# Patient Record
Sex: FEMALE | Race: WHITE | NOT HISPANIC OR LATINO | Employment: UNEMPLOYED | URBAN - METROPOLITAN AREA
[De-identification: names, ages, dates, MRNs, and addresses within clinical notes are randomized per-mention and may not be internally consistent; named-entity substitution may affect disease eponyms.]

---

## 2017-10-18 ENCOUNTER — APPOINTMENT (OUTPATIENT)
Dept: CARDIOLOGY | Facility: HOSPITAL | Age: 32
End: 2017-10-18
Attending: INTERNAL MEDICINE

## 2017-10-18 ENCOUNTER — APPOINTMENT (OUTPATIENT)
Dept: GENERAL RADIOLOGY | Facility: HOSPITAL | Age: 32
End: 2017-10-18
Attending: INTERNAL MEDICINE

## 2017-10-18 ENCOUNTER — HOSPITAL ENCOUNTER (INPATIENT)
Facility: HOSPITAL | Age: 32
LOS: 2 days | Discharge: HOME | End: 2017-10-20
Attending: INTERNAL MEDICINE | Admitting: INTERNAL MEDICINE

## 2017-10-18 ENCOUNTER — APPOINTMENT (OUTPATIENT)
Dept: MRI IMAGING | Facility: HOSPITAL | Age: 32
End: 2017-10-18
Attending: INTERNAL MEDICINE

## 2017-10-18 PROBLEM — I51.4 MYOCARDITIS (HCC): Status: ACTIVE | Noted: 2017-10-18

## 2017-10-18 LAB
ALBUMIN SERPL-MCNC: 3.9 G/DL (ref 3.5–5.2)
ALBUMIN/GLOB SERPL: 1.5 G/DL
ALP SERPL-CCNC: 62 U/L (ref 39–117)
ALT SERPL W P-5'-P-CCNC: 36 U/L (ref 1–33)
ANION GAP SERPL CALCULATED.3IONS-SCNC: 14.7 MMOL/L
AORTIC DIMENSIONLESS INDEX: 0.7 (DI)
ASCENDING AORTA: 2.7 CM
AST SERPL-CCNC: 24 U/L (ref 1–32)
BH CV ECHO MEAS - ACS: 2.1 CM
BH CV ECHO MEAS - AO MAX PG: 9 MMHG
BH CV ECHO MEAS - AO MEAN PG (FULL): 2 MMHG
BH CV ECHO MEAS - AO MEAN PG: 4 MMHG
BH CV ECHO MEAS - AO ROOT AREA (BSA CORRECTED): 1.4
BH CV ECHO MEAS - AO ROOT AREA: 6.2 CM^2
BH CV ECHO MEAS - AO ROOT DIAM: 2.8 CM
BH CV ECHO MEAS - AO V2 MAX: 146 CM/SEC
BH CV ECHO MEAS - AO V2 MEAN: 96.5 CM/SEC
BH CV ECHO MEAS - AO V2 VTI: 32.9 CM
BH CV ECHO MEAS - ASC AORTA: 2.7 CM
BH CV ECHO MEAS - AVA(I,A): 2.1 CM^2
BH CV ECHO MEAS - AVA(I,D): 2.1 CM^2
BH CV ECHO MEAS - BSA(HAYCOCK): 2.1 M^2
BH CV ECHO MEAS - BSA: 2 M^2
BH CV ECHO MEAS - BZI_BMI: 28.1 KILOGRAMS/M^2
BH CV ECHO MEAS - BZI_METRIC_HEIGHT: 175.3 CM
BH CV ECHO MEAS - BZI_METRIC_WEIGHT: 86.2 KG
BH CV ECHO MEAS - CONTRAST EF (2CH): 56.5 ML/M^2
BH CV ECHO MEAS - CONTRAST EF 4CH: 57.3 ML/M^2
BH CV ECHO MEAS - EDV(CUBED): 132.7 ML
BH CV ECHO MEAS - EDV(MOD-SP2): 138 ML
BH CV ECHO MEAS - EDV(MOD-SP4): 131 ML
BH CV ECHO MEAS - EDV(TEICH): 123.8 ML
BH CV ECHO MEAS - EF(CUBED): 58.6 %
BH CV ECHO MEAS - EF(MOD-SP2): 56.5 %
BH CV ECHO MEAS - EF(MOD-SP4): 57.3 %
BH CV ECHO MEAS - EF(TEICH): 50 %
BH CV ECHO MEAS - ESV(CUBED): 54.9 ML
BH CV ECHO MEAS - ESV(MOD-SP2): 60 ML
BH CV ECHO MEAS - ESV(MOD-SP4): 56 ML
BH CV ECHO MEAS - ESV(TEICH): 62 ML
BH CV ECHO MEAS - FS: 25.5 %
BH CV ECHO MEAS - IVS/LVPW: 1.1
BH CV ECHO MEAS - IVSD: 1 CM
BH CV ECHO MEAS - LAT PEAK E' VEL: 16 CM/SEC
BH CV ECHO MEAS - LV DIASTOLIC VOL/BSA (35-75): 64.8 ML/M^2
BH CV ECHO MEAS - LV MASS(C)D: 175.6 GRAMS
BH CV ECHO MEAS - LV MASS(C)DI: 86.9 GRAMS/M^2
BH CV ECHO MEAS - LV MEAN PG: 2 MMHG
BH CV ECHO MEAS - LV SYSTOLIC VOL/BSA (12-30): 27.7 ML/M^2
BH CV ECHO MEAS - LV V1 MAX: 93 CM/SEC
BH CV ECHO MEAS - LV V1 MEAN: 65.3 CM/SEC
BH CV ECHO MEAS - LV V1 VTI: 22.4 CM
BH CV ECHO MEAS - LVIDD: 5.1 CM
BH CV ECHO MEAS - LVIDS: 3.8 CM
BH CV ECHO MEAS - LVLD AP2: 9 CM
BH CV ECHO MEAS - LVLD AP4: 8.7 CM
BH CV ECHO MEAS - LVLS AP2: 8.1 CM
BH CV ECHO MEAS - LVLS AP4: 7.3 CM
BH CV ECHO MEAS - LVOT AREA (M): 3.1 CM^2
BH CV ECHO MEAS - LVOT AREA: 3.1 CM^2
BH CV ECHO MEAS - LVOT DIAM: 2 CM
BH CV ECHO MEAS - LVPWD: 0.9 CM
BH CV ECHO MEAS - MED PEAK E' VEL: 10 CM/SEC
BH CV ECHO MEAS - MR MAX PG: 26.6 MMHG
BH CV ECHO MEAS - MR MAX VEL: 258 CM/SEC
BH CV ECHO MEAS - MV A DUR: 0.17 SEC
BH CV ECHO MEAS - MV A MAX VEL: 54.4 CM/SEC
BH CV ECHO MEAS - MV DEC SLOPE: 296 CM/SEC^2
BH CV ECHO MEAS - MV DEC TIME: 0.29 SEC
BH CV ECHO MEAS - MV E MAX VEL: 67.5 CM/SEC
BH CV ECHO MEAS - MV E/A: 1.2
BH CV ECHO MEAS - MV MAX PG: 3 MMHG
BH CV ECHO MEAS - MV MEAN PG: 2 MMHG
BH CV ECHO MEAS - MV P1/2T MAX VEL: 88.2 CM/SEC
BH CV ECHO MEAS - MV P1/2T: 87.3 MSEC
BH CV ECHO MEAS - MV V2 MEAN: 58.6 CM/SEC
BH CV ECHO MEAS - MV V2 VTI: 23.7 CM
BH CV ECHO MEAS - MVA P1/2T LCG: 2.5 CM^2
BH CV ECHO MEAS - MVA(P1/2T): 2.5 CM^2
BH CV ECHO MEAS - MVA(VTI): 3 CM^2
BH CV ECHO MEAS - PA ACC SLOPE: 856 CM/SEC^2
BH CV ECHO MEAS - PA ACC TIME: 0.12 SEC
BH CV ECHO MEAS - PA MAX PG (FULL): 2.6 MMHG
BH CV ECHO MEAS - PA MAX PG: 4.2 MMHG
BH CV ECHO MEAS - PA PR(ACCEL): 25 MMHG
BH CV ECHO MEAS - PA V2 MAX: 103 CM/SEC
BH CV ECHO MEAS - PI END-D VEL: 77.2 CM/SEC
BH CV ECHO MEAS - PULM A REVS DUR: 0.12 SEC
BH CV ECHO MEAS - PULM A REVS VEL: 27 CM/SEC
BH CV ECHO MEAS - PULM DIAS VEL: 39.3 CM/SEC
BH CV ECHO MEAS - PULM S/D: 1.5
BH CV ECHO MEAS - PULM SYS VEL: 60.9 CM/SEC
BH CV ECHO MEAS - PVA(V,A): 1.6 CM^2
BH CV ECHO MEAS - PVA(V,D): 1.6 CM^2
BH CV ECHO MEAS - QP/QS: 0.61
BH CV ECHO MEAS - RAP SYSTOLE: 3 MMHG
BH CV ECHO MEAS - RV MAX PG: 1.6 MMHG
BH CV ECHO MEAS - RV MEAN PG: 1 MMHG
BH CV ECHO MEAS - RV V1 MAX: 63.9 CM/SEC
BH CV ECHO MEAS - RV V1 MEAN: 49.1 CM/SEC
BH CV ECHO MEAS - RV V1 VTI: 16.9 CM
BH CV ECHO MEAS - RVOT AREA: 2.5 CM^2
BH CV ECHO MEAS - RVOT DIAM: 1.8 CM
BH CV ECHO MEAS - RVSP: 25.1 MMHG
BH CV ECHO MEAS - SI(AO): 100.2 ML/M^2
BH CV ECHO MEAS - SI(CUBED): 38.5 ML/M^2
BH CV ECHO MEAS - SI(LVOT): 34.8 ML/M^2
BH CV ECHO MEAS - SI(MOD-SP2): 38.6 ML/M^2
BH CV ECHO MEAS - SI(MOD-SP4): 37.1 ML/M^2
BH CV ECHO MEAS - SI(TEICH): 30.6 ML/M^2
BH CV ECHO MEAS - SV(AO): 202.6 ML
BH CV ECHO MEAS - SV(CUBED): 77.8 ML
BH CV ECHO MEAS - SV(LVOT): 70.4 ML
BH CV ECHO MEAS - SV(MOD-SP2): 78 ML
BH CV ECHO MEAS - SV(MOD-SP4): 75 ML
BH CV ECHO MEAS - SV(RVOT): 43 ML
BH CV ECHO MEAS - SV(TEICH): 61.9 ML
BH CV ECHO MEAS - TAPSE (>1.6): 2.3 CM2
BH CV ECHO MEAS - TR MAX VEL: 235 CM/SEC
BH CV VAS BP RIGHT ARM: NORMAL MMHG
BH CV XLRA - RV BASE: 2.8 CM
BH CV XLRA - TDI S': 10 CM/SEC
BILIRUB SERPL-MCNC: 0.3 MG/DL (ref 0.1–1.2)
BUN BLD-MCNC: 11 MG/DL (ref 6–20)
BUN/CREAT SERPL: 16.2 (ref 7–25)
CALCIUM SPEC-SCNC: 8.3 MG/DL (ref 8.6–10.5)
CHLORIDE SERPL-SCNC: 100 MMOL/L (ref 98–107)
CHOLEST SERPL-MCNC: 190 MG/DL (ref 0–200)
CO2 SERPL-SCNC: 23.3 MMOL/L (ref 22–29)
CREAT BLD-MCNC: 0.68 MG/DL (ref 0.57–1)
CRP SERPL-MCNC: 0.35 MG/DL (ref 0–0.5)
DEPRECATED RDW RBC AUTO: 42 FL (ref 37–54)
E/E' RATIO: 9
ERYTHROCYTE [DISTWIDTH] IN BLOOD BY AUTOMATED COUNT: 13.2 % (ref 11.7–13)
ERYTHROCYTE [SEDIMENTATION RATE] IN BLOOD: 3 MM/HR (ref 0–20)
GFR SERPL CREATININE-BSD FRML MDRD: 100 ML/MIN/1.73
GLOBULIN UR ELPH-MCNC: 2.6 GM/DL
GLUCOSE BLD-MCNC: 122 MG/DL (ref 65–99)
HBA1C MFR BLD: 5.2 % (ref 4.8–5.6)
HCT VFR BLD AUTO: 40.2 % (ref 35.6–45.5)
HDLC SERPL-MCNC: 44 MG/DL (ref 40–60)
HGB BLD-MCNC: 13.9 G/DL (ref 11.9–15.5)
LDLC SERPL CALC-MCNC: 125 MG/DL (ref 0–100)
LDLC/HDLC SERPL: 2.85 {RATIO}
LEFT ATRIUM VOLUME INDEX: 27 ML/M2
MCH RBC QN AUTO: 29.9 PG (ref 26.9–32)
MCHC RBC AUTO-ENTMCNC: 34.6 G/DL (ref 32.4–36.3)
MCV RBC AUTO: 86.5 FL (ref 80.5–98.2)
NT-PROBNP SERPL-MCNC: 92.8 PG/ML (ref 0–450)
PLATELET # BLD AUTO: 254 10*3/MM3 (ref 140–500)
PMV BLD AUTO: 10.8 FL (ref 6–12)
POTASSIUM BLD-SCNC: 3.3 MMOL/L (ref 3.5–5.2)
PROT SERPL-MCNC: 6.5 G/DL (ref 6–8.5)
RBC # BLD AUTO: 4.65 10*6/MM3 (ref 3.9–5.2)
SODIUM BLD-SCNC: 138 MMOL/L (ref 136–145)
TRIGL SERPL-MCNC: 103 MG/DL (ref 0–150)
TROPONIN T SERPL-MCNC: 0.03 NG/ML (ref 0–0.03)
TSH SERPL DL<=0.05 MIU/L-ACNC: 1.95 MIU/ML (ref 0.27–4.2)
VLDLC SERPL-MCNC: 20.6 MG/DL (ref 5–40)
WBC NRBC COR # BLD: 10.62 10*3/MM3 (ref 4.5–10.7)

## 2017-10-18 PROCEDURE — B215YZZ FLUOROSCOPY OF LEFT HEART USING OTHER CONTRAST: ICD-10-PCS | Performed by: INTERNAL MEDICINE

## 2017-10-18 PROCEDURE — 93010 ELECTROCARDIOGRAM REPORT: CPT | Performed by: INTERNAL MEDICINE

## 2017-10-18 PROCEDURE — 25010000002 MIDAZOLAM PER 1 MG: Performed by: INTERNAL MEDICINE

## 2017-10-18 PROCEDURE — 83880 ASSAY OF NATRIURETIC PEPTIDE: CPT | Performed by: INTERNAL MEDICINE

## 2017-10-18 PROCEDURE — A9577 INJ MULTIHANCE: HCPCS | Performed by: INTERNAL MEDICINE

## 2017-10-18 PROCEDURE — 0 IOPAMIDOL PER 1 ML: Performed by: INTERNAL MEDICINE

## 2017-10-18 PROCEDURE — 80061 LIPID PANEL: CPT | Performed by: INTERNAL MEDICINE

## 2017-10-18 PROCEDURE — C1769 GUIDE WIRE: HCPCS | Performed by: INTERNAL MEDICINE

## 2017-10-18 PROCEDURE — 84443 ASSAY THYROID STIM HORMONE: CPT | Performed by: INTERNAL MEDICINE

## 2017-10-18 PROCEDURE — 4A023N7 MEASUREMENT OF CARDIAC SAMPLING AND PRESSURE, LEFT HEART, PERCUTANEOUS APPROACH: ICD-10-PCS | Performed by: INTERNAL MEDICINE

## 2017-10-18 PROCEDURE — 85027 COMPLETE CBC AUTOMATED: CPT | Performed by: INTERNAL MEDICINE

## 2017-10-18 PROCEDURE — 0 GADOBENATE DIMEGLUMINE 529 MG/ML SOLUTION: Performed by: INTERNAL MEDICINE

## 2017-10-18 PROCEDURE — 93458 L HRT ARTERY/VENTRICLE ANGIO: CPT | Performed by: INTERNAL MEDICINE

## 2017-10-18 PROCEDURE — 75561 CARDIAC MRI FOR MORPH W/DYE: CPT | Performed by: INTERNAL MEDICINE

## 2017-10-18 PROCEDURE — 25010000002 ONDANSETRON PER 1 MG: Performed by: INTERNAL MEDICINE

## 2017-10-18 PROCEDURE — 80053 COMPREHEN METABOLIC PANEL: CPT | Performed by: INTERNAL MEDICINE

## 2017-10-18 PROCEDURE — 99223 1ST HOSP IP/OBS HIGH 75: CPT | Performed by: INTERNAL MEDICINE

## 2017-10-18 PROCEDURE — C1894 INTRO/SHEATH, NON-LASER: HCPCS | Performed by: INTERNAL MEDICINE

## 2017-10-18 PROCEDURE — 99152 MOD SED SAME PHYS/QHP 5/>YRS: CPT | Performed by: INTERNAL MEDICINE

## 2017-10-18 PROCEDURE — 93306 TTE W/DOPPLER COMPLETE: CPT

## 2017-10-18 PROCEDURE — 71020 HC CHEST PA AND LATERAL: CPT

## 2017-10-18 PROCEDURE — 93005 ELECTROCARDIOGRAM TRACING: CPT | Performed by: INTERNAL MEDICINE

## 2017-10-18 PROCEDURE — 85652 RBC SED RATE AUTOMATED: CPT | Performed by: INTERNAL MEDICINE

## 2017-10-18 PROCEDURE — 25010000002 FENTANYL CITRATE (PF) 100 MCG/2ML SOLUTION: Performed by: INTERNAL MEDICINE

## 2017-10-18 PROCEDURE — 84484 ASSAY OF TROPONIN QUANT: CPT | Performed by: INTERNAL MEDICINE

## 2017-10-18 PROCEDURE — 86140 C-REACTIVE PROTEIN: CPT | Performed by: INTERNAL MEDICINE

## 2017-10-18 PROCEDURE — 75561 CARDIAC MRI FOR MORPH W/DYE: CPT

## 2017-10-18 PROCEDURE — B211YZZ FLUOROSCOPY OF MULTIPLE CORONARY ARTERIES USING OTHER CONTRAST: ICD-10-PCS | Performed by: INTERNAL MEDICINE

## 2017-10-18 PROCEDURE — 25010000002 HEPARIN (PORCINE) PER 1000 UNITS: Performed by: INTERNAL MEDICINE

## 2017-10-18 PROCEDURE — 93306 TTE W/DOPPLER COMPLETE: CPT | Performed by: INTERNAL MEDICINE

## 2017-10-18 PROCEDURE — 83036 HEMOGLOBIN GLYCOSYLATED A1C: CPT | Performed by: INTERNAL MEDICINE

## 2017-10-18 RX ORDER — ONDANSETRON 2 MG/ML
4 INJECTION INTRAMUSCULAR; INTRAVENOUS EVERY 6 HOURS PRN
Status: DISCONTINUED | OUTPATIENT
Start: 2017-10-18 | End: 2017-10-20 | Stop reason: HOSPADM

## 2017-10-18 RX ORDER — TEMAZEPAM 15 MG/1
15 CAPSULE ORAL NIGHTLY PRN
Status: DISCONTINUED | OUTPATIENT
Start: 2017-10-18 | End: 2017-10-20 | Stop reason: HOSPADM

## 2017-10-18 RX ORDER — FENTANYL CITRATE 50 UG/ML
INJECTION, SOLUTION INTRAMUSCULAR; INTRAVENOUS AS NEEDED
Status: DISCONTINUED | OUTPATIENT
Start: 2017-10-18 | End: 2017-10-18 | Stop reason: HOSPADM

## 2017-10-18 RX ORDER — POTASSIUM CHLORIDE 750 MG/1
20 CAPSULE, EXTENDED RELEASE ORAL DAILY
Status: DISCONTINUED | OUTPATIENT
Start: 2017-10-18 | End: 2017-10-20 | Stop reason: HOSPADM

## 2017-10-18 RX ORDER — COLCHICINE 0.6 MG/1
0.6 TABLET ORAL DAILY
Status: DISCONTINUED | OUTPATIENT
Start: 2017-10-18 | End: 2017-10-19

## 2017-10-18 RX ORDER — SODIUM CHLORIDE 9 MG/ML
INJECTION, SOLUTION INTRAVENOUS CONTINUOUS PRN
Status: DISCONTINUED | OUTPATIENT
Start: 2017-10-18 | End: 2017-10-18 | Stop reason: HOSPADM

## 2017-10-18 RX ORDER — LIDOCAINE HYDROCHLORIDE 20 MG/ML
INJECTION, SOLUTION INFILTRATION; PERINEURAL AS NEEDED
Status: DISCONTINUED | OUTPATIENT
Start: 2017-10-18 | End: 2017-10-18 | Stop reason: HOSPADM

## 2017-10-18 RX ORDER — NITROGLYCERIN 20 MG/100ML
INJECTION INTRAVENOUS CONTINUOUS PRN
Status: DISCONTINUED | OUTPATIENT
Start: 2017-10-18 | End: 2017-10-18 | Stop reason: HOSPADM

## 2017-10-18 RX ORDER — SODIUM CHLORIDE 9 MG/ML
100 INJECTION, SOLUTION INTRAVENOUS CONTINUOUS
Status: DISCONTINUED | OUTPATIENT
Start: 2017-10-18 | End: 2017-10-20 | Stop reason: HOSPADM

## 2017-10-18 RX ORDER — ONDANSETRON 4 MG/1
4 TABLET, FILM COATED ORAL EVERY 6 HOURS PRN
Status: DISCONTINUED | OUTPATIENT
Start: 2017-10-18 | End: 2017-10-20 | Stop reason: HOSPADM

## 2017-10-18 RX ORDER — HYDROCODONE BITARTRATE AND ACETAMINOPHEN 5; 325 MG/1; MG/1
1 TABLET ORAL EVERY 4 HOURS PRN
Status: DISCONTINUED | OUTPATIENT
Start: 2017-10-18 | End: 2017-10-20 | Stop reason: HOSPADM

## 2017-10-18 RX ORDER — MIDAZOLAM HYDROCHLORIDE 1 MG/ML
INJECTION INTRAMUSCULAR; INTRAVENOUS AS NEEDED
Status: DISCONTINUED | OUTPATIENT
Start: 2017-10-18 | End: 2017-10-18 | Stop reason: HOSPADM

## 2017-10-18 RX ORDER — ONDANSETRON 2 MG/ML
INJECTION INTRAMUSCULAR; INTRAVENOUS AS NEEDED
Status: DISCONTINUED | OUTPATIENT
Start: 2017-10-18 | End: 2017-10-18 | Stop reason: HOSPADM

## 2017-10-18 RX ORDER — CALCIUM CARBONATE 200(500)MG
2 TABLET,CHEWABLE ORAL 2 TIMES DAILY PRN
Status: DISCONTINUED | OUTPATIENT
Start: 2017-10-18 | End: 2017-10-20 | Stop reason: HOSPADM

## 2017-10-18 RX ORDER — METOPROLOL TARTRATE 5 MG/5ML
INJECTION INTRAVENOUS AS NEEDED
Status: DISCONTINUED | OUTPATIENT
Start: 2017-10-18 | End: 2017-10-18 | Stop reason: HOSPADM

## 2017-10-18 RX ORDER — LOSARTAN POTASSIUM 25 MG/1
25 TABLET ORAL DAILY
Status: DISCONTINUED | OUTPATIENT
Start: 2017-10-18 | End: 2017-10-20 | Stop reason: HOSPADM

## 2017-10-18 RX ORDER — ONDANSETRON 4 MG/1
4 TABLET, ORALLY DISINTEGRATING ORAL EVERY 6 HOURS PRN
Status: DISCONTINUED | OUTPATIENT
Start: 2017-10-18 | End: 2017-10-20 | Stop reason: HOSPADM

## 2017-10-18 RX ORDER — ACETAMINOPHEN 325 MG/1
650 TABLET ORAL EVERY 6 HOURS PRN
Status: DISCONTINUED | OUTPATIENT
Start: 2017-10-18 | End: 2017-10-20 | Stop reason: HOSPADM

## 2017-10-18 RX ADMIN — LOSARTAN POTASSIUM 25 MG: 25 TABLET, FILM COATED ORAL at 18:16

## 2017-10-18 RX ADMIN — ONDANSETRON 4 MG: 2 INJECTION INTRAMUSCULAR; INTRAVENOUS at 12:56

## 2017-10-18 RX ADMIN — COLCHICINE 0.6 MG: 0.6 TABLET, FILM COATED ORAL at 20:17

## 2017-10-18 RX ADMIN — METOPROLOL TARTRATE 25 MG: 25 TABLET ORAL at 23:26

## 2017-10-18 RX ADMIN — ACETAMINOPHEN 650 MG: 325 TABLET ORAL at 20:16

## 2017-10-18 RX ADMIN — POTASSIUM CHLORIDE 20 MEQ: 750 CAPSULE, EXTENDED RELEASE ORAL at 18:16

## 2017-10-18 RX ADMIN — ACETAMINOPHEN 650 MG: 325 TABLET ORAL at 13:30

## 2017-10-18 RX ADMIN — METOPROLOL TARTRATE 25 MG: 25 TABLET ORAL at 14:18

## 2017-10-18 RX ADMIN — GADOBENATE DIMEGLUMINE 18 ML: 529 INJECTION, SOLUTION INTRAVENOUS at 11:15

## 2017-10-18 RX ADMIN — SODIUM CHLORIDE 100 ML/HR: 9 INJECTION, SOLUTION INTRAVENOUS at 14:19

## 2017-10-18 NOTE — PLAN OF CARE
Problem: Patient Care Overview (Adult)  Goal: Plan of Care Review  Outcome: Ongoing (interventions implemented as appropriate)    10/18/17 7453   Coping/Psychosocial Response Interventions   Plan Of Care Reviewed With patient;spouse   Patient Care Overview   Progress improving   Outcome Evaluation   Outcome Summary/Follow up Plan no intervention done in cath lab per Dr. Freeman. R radial site is c/d/i and soft to touch. VSS. will continue to monitor.       Goal: Adult Individualization and Mutuality  Outcome: Ongoing (interventions implemented as appropriate)  Goal: Discharge Needs Assessment  Outcome: Ongoing (interventions implemented as appropriate)    Problem: Cardiac Catheterization with/without PCI (Adult)  Goal: Signs and Symptoms of Listed Potential Problems Will be Absent or Manageable (Cardiac Catheterization with/without PCI)  Outcome: Ongoing (interventions implemented as appropriate)    Problem: Fall Risk (Adult)  Goal: Identify Related Risk Factors and Signs and Symptoms  Outcome: Ongoing (interventions implemented as appropriate)  Goal: Absence of Falls  Outcome: Ongoing (interventions implemented as appropriate)

## 2017-10-18 NOTE — H&P
Date of Hospital Visit: 10/18/17  Encounter Provider: Rishi Freeman MD  Place of Service: [facility] CARDIOLOGY  Patient Name: Kady Valiente  :1985  [id number]      Chief complaint: Chest pain      History of Present Illness: 32-year-old woman previously healthy recently had a child 2 months ago.  No recent clinical illness.  Who was awaken this morning with severe substernal chest pain and shortness of breath.  911 was called and [facility] recognized a STEMI.  Air transport was arranged and she was picked up in the field and brought directly here.  On arrival here she is having ongoing chest discomfort and feels weak area she did have some hypertension during her pregnancy but otherwise has not had any past medical history specifically denies tobacco abuse drug abuse hypertension diabetes or hyperlipidemia.  She has not had any recent illness.  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      No past surgical history on file.    No prescriptions prior to admission.       Current Meds  No current facility-administered medications on file prior to encounter.      No current outpatient prescriptions on file prior to encounter.         Social History     Social History   • Marital status:      Spouse name: N/A   • Number of children: N/A   • Years of education: N/A     Occupational History   • Not on file.     Social History Main Topics   • Smoking status: Not on file   • Smokeless tobacco: Not on file   • Alcohol use Not on file   • Drug use: Not on file   • Sexual activity: Not on file     Other Topics Concern   • Not on file     Social History Narrative       Family Hx: Non-contributory      REVIEW OF SYSTEMS:   LEOLA  was performed and is negative except as outlined in HPI     REVIEW OF SYSTEMS:   CONSTITUTIONAL: No weight loss, fever, chills, weakness or fatigue.   HEENT: Eyes: No visual loss, blurred vision, double vision or yellow sclerae. Ears, Nose, Throat: No hearing loss, sneezing, congestion, runny nose or sore throat.   SKIN: No rash or itching.     RESPIRATORY: No shortness of breath, hemoptysis, cough or sputum.   GASTROINTESTINAL: No anorexia, nausea, vomiting or diarrhea. No abdominal pain, bright red blood per rectum or melena.  NEUROLOGICAL: No headache, dizziness, syncope, paralysis, numbness or tingling in the extremities.  MUSCULOSKELETAL: No muscle, back pain, joint pain or stiffness.   HEMATOLOGIC: No anemia, bleeding or bruising.   LYMPHATICS: No enlarged nodes.  PSYCHIATRIC: No history of depression, anxiety, hallucinations.   ENDOCRINOLOGIC: No reports of sweating, cold or heat intolerance. No polyuria or polydipsia.       Objective:     Vitals:    10/18/17 0901 10/18/17 0906   Resp: 16 17     There is no height or weight on file to calculate BMI.      General Appearance:    Alert, oriented x 3, in no acute distress   Head:    Normocephalic, without obvious abnormality, atraumatic   Ears:    Ears appear intact with no abnormalities noted   Throat:   No oral lesions, dentition good   Neck:   No adenopathy, supple, trachea midline, no thyromegaly, no carotid bruit, no JVD   Lungs:    Breath sounds are equal and  clear to auscultation    Heart:   Normal S1 and S2, RRR, no murmur/gallop or rub   Abdomen:    Normal bowel sounds, obese, soft non-tender, non-distended, no organomegaly, no guarding   Extremities:   Moves all extremities well, no edema, no cyanosis, no redness   Pulses:   Pulses palpable and equal bilaterally. Normal radial pulses   Skin:   No bleeding, bruising or rash   Lymph nodes:   No palpable adenopathy     I personally viewed and interpreted the patient's EKG/Telemetry  data    Assessment:  Active Hospital Problems (** Indicates Principal Problem)    Diagnosis Date Noted   • Myocarditis [I51.4] 10/18/2017      Resolved Hospital Problems    Diagnosis Date Noted Date Resolved   No resolved problems to display.         Plan:Surprisingly on her ECG it does appear that she is having an inferolateral MI area and this is obviously a little bit of an odd situation and a young woman with no clear cardiac risk factors I suspect if she is having a cardiac issue that it could be either spontaneous coronary artery dissection, vasospasm, traditional atherosclerosis or possibly a perimyocarditis area we are going to take her directly to the Cath Lab where we will do a complete diagnostic catheterization.  The right radial artery further decisions will be based on the findings at the time of catheter.  However if we do not find anything significant with catheter will have to consider an MRI    It appears that she has a focal area of myocarditis in her inferior wall on MRI.  We will start some colchicine, a little bit of beta blockade, and an ARB to keep her pressure down, but I anticipate she will make a complete recovery.  We will watch her for any arrhythmia for at least another 24 hours.

## 2017-10-18 NOTE — CONSULTS
Reviewed chart secondary to referral for cardiac rehab evaluate/enroll.  Read Dr. Freeman's note from cath lab procedure.  Pt needing further workup to clarify diagnosis.  Pt will need to rule-in for MI to qualify for phase II cardiac rehab.  She also lives closer to [facility]'s cardiac rehab program.

## 2017-10-18 NOTE — DISCHARGE INSTRUCTIONS
[facility]  [address] [place] [address]    Coronary Angiogram (Radial/Ulnar Approach) After Care    Refer to this sheet in the next few weeks. These instructions provide you with information on caring for yourself after your procedure. Your caregiver may also give you more specific instructions. Your treatment has been planned according to current medical practices, but problems sometimes occur. Call your caregiver if you have any problems or questions after your procedure.    Home Care Instructions:  · You may shower the day after the procedure. Remove the bandage (dressing) and gently wash the site with plain soap and water. Gently pat the site dry. You may apply a band aid daily for 2 days if desired.    · Do not apply powder or lotion to the site.  · Do not submerge the affected site in water for 3 to 5 days or until the site is completely healed.   · Do not lift, push or pull anything over 10 pounds for 2 days after your procedure.  · Inspect the site at least twice daily. You may notice some bruising at the site and it may be tender for 1 to 2 weeks.     · Increase your fluid intake for the next 2 days.    · Keep arm elevated for 24 hours. For the remainder of the day, keep your arm in “Pledge of Allegiance” position when up and about.     · You may drive 24 hours after the procedure unless otherwise instructed by your caregiver.  · Do not operate machinery or power tools for 24 hours.  · A responsible adult should be with you for the first 24 hours after you arrive home. Do not make any important legal decisions or sign legal papers for 24 hours.      Call Your Doctor if:   · You have unusual pain at the radial/ulnar (wrist) site.  · You have redness, warmth, swelling, or pain at the radial/ulnar (wrist) site.  · You have drainage (other than a small amount of blood on the dressing).  · You have chills or a fever > 101.  · Your arm becomes pale or dark, cool, tingly, or numb.  · You  have heavy bleeding from the site, hold pressure on the site for 20 minutes.  If the bleeding stops, apply a fresh bandage and call your cardiologist.  However, if you continue to have bleeding, call 911.

## 2017-10-19 LAB
AMPHET+METHAMPHET UR QL: NEGATIVE
ANION GAP SERPL CALCULATED.3IONS-SCNC: 11.8 MMOL/L
B PERT DNA SPEC QL NAA+PROBE: NOT DETECTED
BARBITURATES UR QL SCN: NEGATIVE
BENZODIAZ UR QL SCN: POSITIVE
BUN BLD-MCNC: 7 MG/DL (ref 6–20)
BUN/CREAT SERPL: 11.3 (ref 7–25)
C PNEUM DNA NPH QL NAA+NON-PROBE: NOT DETECTED
CALCIUM SPEC-SCNC: 8.6 MG/DL (ref 8.6–10.5)
CANNABINOIDS SERPL QL: NEGATIVE
CHLORIDE SERPL-SCNC: 104 MMOL/L (ref 98–107)
CO2 SERPL-SCNC: 23.2 MMOL/L (ref 22–29)
COCAINE UR QL: NEGATIVE
CREAT BLD-MCNC: 0.62 MG/DL (ref 0.57–1)
CRP SERPL-MCNC: 0.43 MG/DL (ref 0–0.5)
DEPRECATED RDW RBC AUTO: 44.7 FL (ref 37–54)
ERYTHROCYTE [DISTWIDTH] IN BLOOD BY AUTOMATED COUNT: 13.5 % (ref 11.7–13)
FLUAV H1 2009 PAND RNA NPH QL NAA+PROBE: NOT DETECTED
FLUAV H1 HA GENE NPH QL NAA+PROBE: NOT DETECTED
FLUAV H3 RNA NPH QL NAA+PROBE: NOT DETECTED
FLUAV SUBTYP SPEC NAA+PROBE: NOT DETECTED
FLUBV RNA ISLT QL NAA+PROBE: NOT DETECTED
GFR SERPL CREATININE-BSD FRML MDRD: 112 ML/MIN/1.73
GLUCOSE BLD-MCNC: 94 MG/DL (ref 65–99)
HADV DNA SPEC NAA+PROBE: NOT DETECTED
HCOV 229E RNA SPEC QL NAA+PROBE: NOT DETECTED
HCOV HKU1 RNA SPEC QL NAA+PROBE: NOT DETECTED
HCOV NL63 RNA SPEC QL NAA+PROBE: NOT DETECTED
HCOV OC43 RNA SPEC QL NAA+PROBE: NOT DETECTED
HCT VFR BLD AUTO: 43.2 % (ref 35.6–45.5)
HGB BLD-MCNC: 13.9 G/DL (ref 11.9–15.5)
HMPV RNA NPH QL NAA+NON-PROBE: NOT DETECTED
HPIV1 RNA SPEC QL NAA+PROBE: NOT DETECTED
HPIV2 RNA SPEC QL NAA+PROBE: NOT DETECTED
HPIV3 RNA NPH QL NAA+PROBE: NOT DETECTED
HPIV4 P GENE NPH QL NAA+PROBE: NOT DETECTED
M PNEUMO IGG SER IA-ACNC: NOT DETECTED
MCH RBC QN AUTO: 29.5 PG (ref 26.9–32)
MCHC RBC AUTO-ENTMCNC: 32.2 G/DL (ref 32.4–36.3)
MCV RBC AUTO: 91.7 FL (ref 80.5–98.2)
METHADONE UR QL SCN: NEGATIVE
OPIATES UR QL: NEGATIVE
OXYCODONE UR QL SCN: NEGATIVE
PLATELET # BLD AUTO: 237 10*3/MM3 (ref 140–500)
PMV BLD AUTO: 10.6 FL (ref 6–12)
POTASSIUM BLD-SCNC: 4.1 MMOL/L (ref 3.5–5.2)
RBC # BLD AUTO: 4.71 10*6/MM3 (ref 3.9–5.2)
RHINOVIRUS RNA SPEC NAA+PROBE: NOT DETECTED
RSV RNA NPH QL NAA+NON-PROBE: NOT DETECTED
SODIUM BLD-SCNC: 139 MMOL/L (ref 136–145)
TROPONIN T SERPL-MCNC: 1.13 NG/ML (ref 0–0.03)
WBC NRBC COR # BLD: 11.35 10*3/MM3 (ref 4.5–10.7)

## 2017-10-19 PROCEDURE — 84484 ASSAY OF TROPONIN QUANT: CPT | Performed by: INTERNAL MEDICINE

## 2017-10-19 PROCEDURE — 85027 COMPLETE CBC AUTOMATED: CPT | Performed by: INTERNAL MEDICINE

## 2017-10-19 PROCEDURE — 99233 SBSQ HOSP IP/OBS HIGH 50: CPT | Performed by: INTERNAL MEDICINE

## 2017-10-19 PROCEDURE — 87633 RESP VIRUS 12-25 TARGETS: CPT | Performed by: INTERNAL MEDICINE

## 2017-10-19 PROCEDURE — 80307 DRUG TEST PRSMV CHEM ANLYZR: CPT | Performed by: INTERNAL MEDICINE

## 2017-10-19 PROCEDURE — 86140 C-REACTIVE PROTEIN: CPT | Performed by: INTERNAL MEDICINE

## 2017-10-19 PROCEDURE — 87581 M.PNEUMON DNA AMP PROBE: CPT | Performed by: INTERNAL MEDICINE

## 2017-10-19 PROCEDURE — 93010 ELECTROCARDIOGRAM REPORT: CPT | Performed by: INTERNAL MEDICINE

## 2017-10-19 PROCEDURE — 80048 BASIC METABOLIC PNL TOTAL CA: CPT | Performed by: INTERNAL MEDICINE

## 2017-10-19 PROCEDURE — 87486 CHLMYD PNEUM DNA AMP PROBE: CPT | Performed by: INTERNAL MEDICINE

## 2017-10-19 PROCEDURE — 87798 DETECT AGENT NOS DNA AMP: CPT | Performed by: INTERNAL MEDICINE

## 2017-10-19 PROCEDURE — 93005 ELECTROCARDIOGRAM TRACING: CPT | Performed by: INTERNAL MEDICINE

## 2017-10-19 RX ORDER — METOPROLOL TARTRATE 50 MG/1
50 TABLET, FILM COATED ORAL EVERY 12 HOURS SCHEDULED
Status: DISCONTINUED | OUTPATIENT
Start: 2017-10-19 | End: 2017-10-20 | Stop reason: HOSPADM

## 2017-10-19 RX ORDER — COLCHICINE 0.6 MG/1
0.6 TABLET ORAL EVERY 12 HOURS SCHEDULED
Status: DISCONTINUED | OUTPATIENT
Start: 2017-10-19 | End: 2017-10-20

## 2017-10-19 RX ADMIN — COLCHICINE 0.6 MG: 0.6 TABLET, FILM COATED ORAL at 20:06

## 2017-10-19 RX ADMIN — METOPROLOL TARTRATE 50 MG: 25 TABLET ORAL at 09:50

## 2017-10-19 RX ADMIN — POTASSIUM CHLORIDE 20 MEQ: 750 CAPSULE, EXTENDED RELEASE ORAL at 09:51

## 2017-10-19 RX ADMIN — LOSARTAN POTASSIUM 25 MG: 25 TABLET, FILM COATED ORAL at 09:51

## 2017-10-19 RX ADMIN — ACETAMINOPHEN 650 MG: 325 TABLET ORAL at 15:39

## 2017-10-19 RX ADMIN — COLCHICINE 0.6 MG: 0.6 TABLET, FILM COATED ORAL at 09:51

## 2017-10-19 RX ADMIN — METOPROLOL TARTRATE 50 MG: 25 TABLET ORAL at 20:06

## 2017-10-19 NOTE — PLAN OF CARE
Problem: Patient Care Overview (Adult)  Goal: Plan of Care Review  Outcome: Ongoing (interventions implemented as appropriate)    10/19/17 1800   Patient Care Overview   Progress improving   Outcome Evaluation   Outcome Summary/Follow up Plan HR in SR. VSS. plan for dc tomorrow per Dr. Freeman       Goal: Adult Individualization and Mutuality  Outcome: Ongoing (interventions implemented as appropriate)  Goal: Discharge Needs Assessment  Outcome: Ongoing (interventions implemented as appropriate)    Problem: Cardiac Catheterization with/without PCI (Adult)  Goal: Signs and Symptoms of Listed Potential Problems Will be Absent or Manageable (Cardiac Catheterization with/without PCI)  Outcome: Ongoing (interventions implemented as appropriate)    Problem: Fall Risk (Adult)  Goal: Identify Related Risk Factors and Signs and Symptoms  Outcome: Ongoing (interventions implemented as appropriate)  Goal: Absence of Falls  Outcome: Ongoing (interventions implemented as appropriate)

## 2017-10-19 NOTE — CONSULTS
Reviewed Dr. Freeman's note from this am.  Myocarditis appears to be diagnosis, so she would not qualify for cardiac rehab.

## 2017-10-19 NOTE — PAYOR COMM NOTE
"DenisKady padilla (32 y.o. Female)            ATTENTION; NURSE REVIEW, REPLY TO UR DEPT, WILFREDO [facility] N  OR UR FAX          [phone] / AUTH PENDING [id number]       Date of Birth Social Security Number Address Home Phone MRN    1985  [address] [place] [address] [phone] [id number]    Oriental orthodox Marital Status          Unknown        Admission Date Admission Type Admitting Provider Attending Provider Department, Room/Bed    10/18/17 Elective Rishi Freeman MD Dillon, William, MD [address] [facility],     Discharge Date Discharge Disposition Discharge Destination                      Attending Provider: Rishi Freeman MD     Allergies:  Penicillins, Prednisone    Isolation:  None   Infection:  None   Code Status:  FULL    Ht:  69\" (175.3 cm)   Wt:  190 lb (86.2 kg)    Admission Cmt:  None   Principal Problem:  None                Active Insurance as of 10/18/2017     Primary Coverage     Payor Plan Insurance Group Employer/Plan Group    [employer] [facility] [employer] [facility] PPO [id number]     Payor Plan Address Payor Plan Phone Number Effective From Effective To    [address] [phone] 10/1/2016     [place] [address]       Subscriber Name Subscriber Birth Date Member ID       SRIKANTH SMITH 7/3/1981 [id number]                 Emergency Contacts      (Rel.) Home Phone Work Phone Mobile Phone    Srikanth Smith (Spouse) [phone] -- --               History & Physical      Rishi Freeman MD at 10/18/2017  9:25 AM          Date of Hospital Visit: 10/18/17  Encounter Provider: Rishi Freeman MD  Place of Service: [facility] CARDIOLOG[facility]  Patient Name: Kady Smith  :1985  [id number]      Chief complaint: Chest pain      History of Present Illness: 32-year-old woman previously healthy recently had a child 2 months ago.  No recent clinical illness.  Who was awaken this " morning with severe substernal chest pain and shortness of breath.  911 was called and [facility] recognized a STEMI.  Air transport was arranged and she was picked up in the field and brought directly here.  On arrival here she is having ongoing chest discomfort and feels weak area she did have some hypertension during her pregnancy but otherwise has not had any past medical history specifically denies tobacco abuse drug abuse hypertension diabetes or hyperlipidemia.  She has not had any recent illness.  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      No past surgical history on file.    No prescriptions prior to admission.       Current Meds  No current facility-administered medications on file prior to encounter.      No current outpatient prescriptions on file prior to encounter.         Social History     Social History   • Marital status:      Spouse name: N/A   • Number of children: N/A   • Years of education: N/A     Occupational History   • Not on file.     Social History Main Topics   • Smoking status: Not on file   • Smokeless tobacco: Not on file   • Alcohol use Not on file   • Drug use: Not on file   • Sexual activity: Not on file     Other Topics Concern   • Not on file     Social History Narrative       Family Hx: Non-contributory      REVIEW OF SYSTEMS:   ROS was performed and is negative except as outlined in HPI     REVIEW OF SYSTEMS:   CONSTITUTIONAL: No weight loss, fever, chills, weakness or fatigue.   HEENT: Eyes: No visual loss, blurred vision, double vision or yellow sclerae. Ears, Nose, Throat: No hearing loss, sneezing, congestion, runny nose or sore throat.   SKIN: No rash or itching.     RESPIRATORY: No shortness of breath,  hemoptysis, cough or sputum.   GASTROINTESTINAL: No anorexia, nausea, vomiting or diarrhea. No abdominal pain, bright red blood per rectum or melena.  NEUROLOGICAL: No headache, dizziness, syncope, paralysis, numbness or tingling in the extremities.  MUSCULOSKELETAL: No muscle, back pain, joint pain or stiffness.   HEMATOLOGIC: No anemia, bleeding or bruising.   LYMPHATICS: No enlarged nodes.  PSYCHIATRIC: No history of depression, anxiety, hallucinations.   ENDOCRINOLOGIC: No reports of sweating, cold or heat intolerance. No polyuria or polydipsia.       Objective:     Vitals:    10/18/17 0901 10/18/17 0906   Resp: 16 17     There is no height or weight on file to calculate BMI.      General Appearance:    Alert, oriented x 3, in no acute distress   Head:    Normocephalic, without obvious abnormality, atraumatic   Ears:    Ears appear intact with no abnormalities noted   Throat:   No oral lesions, dentition good   Neck:   No adenopathy, supple, trachea midline, no thyromegaly, no carotid bruit, no JVD   Lungs:    Breath sounds are equal and  clear to auscultation    Heart:   Normal S1 and S2, RRR, no murmur/gallop or rub   Abdomen:    Normal bowel sounds, obese, soft non-tender, non-distended, no organomegaly, no guarding   Extremities:   Moves all extremities well, no edema, no cyanosis, no redness   Pulses:   Pulses palpable and equal bilaterally. Normal radial pulses   Skin:   No bleeding, bruising or rash   Lymph nodes:   No palpable adenopathy     I personally viewed and interpreted the patient's EKG/Telemetry data    Assessment:  Active Hospital Problems (** Indicates Principal Problem)    Diagnosis Date Noted   • Myocarditis [I51.4] 10/18/2017      Resolved Hospital Problems    Diagnosis Date Noted Date Resolved   No resolved problems to display.         Plan:Surprisingly on her ECG it does appear that she is having an inferolateral MI area and this is obviously a little bit of an odd situation and a young  woman with no clear cardiac risk factors I suspect if she is having a cardiac issue that it could be either spontaneous coronary artery dissection, vasospasm, traditional atherosclerosis or possibly a perimyocarditis area we are going to take her directly to the Cath Lab where we will do a complete diagnostic catheterization.  The right radial artery further decisions will be based on the findings at the time of catheter.  However if we do not find anything significant with catheter will have to consider an MRI    It appears that she has a focal area of myocarditis in her inferior wall on MRI.  We will start some colchicine, a little bit of beta blockade, and an ARB to keep her pressure down, but I anticipate she will make a complete recovery.  We will watch her for any arrhythmia for at least another 24 hours.            Electronically signed by Rishi Freeman MD at 10/19/2017  7:28 AM          [facility] CATH LAB  [address] [place] [id number]  [phone]             Patient Information   Patient Name MRN Sex  (Age)   Kady Valiente [id number] Female 1985 (32 y.o.)   Race Ethnicity Encounter Category   White or  Not  or  Elective   Procedures   Coronary angiography   Left ventriculography   Left Heart Cath    Performed Date   Oct 18, 2017      Physicians   Panel Physicians Referring Physician Case Authorizing Physician   Rishi Freeman MD (Primary)     Pre-procedure Diagnosis   STEMI       Conclusion   CARDIAC CATHETERIZATION REPORT     Procedure:Left heart catheterization      DATE OF PROCEDURE: 10/18/17     PROCEDURE PERFORMED BY: Rishi Freeman MD, [facility]     INDICATION FOR PROCEDURE: Inferior lateral STEMI     DESCRIPTION OF PROCEDURE: Because of the unusual situation of a young woman with no traditional risks having an inferolateral STEMI we are given a proceed on with a complete diagnostic catheterization first.   After consent was obtained,  access was gained in his right radial artery using a micropuncture technique. A 6-Botswanan short sheath was placed without difficulty.  Intraarterial cocktail was given.  Left ventriculography was performed followed by selective injection of the left and right coronary arteries were performed using a JL-3.5 and JR-4 diagnostic catheter.  Patient tolerated the procedure well without early complication and EBL was minimal.  At the conclusion, the sheath was removed and hemostasis was obtained. [facility].     FINDINGS:     LEFT VENTRICULOGRAPHY: The LV pressure was 140/10 .  There was normal global LV systolic function there is a small area of inferoapical wall motion abnormality .  There was no mitral insufficiency or gradient across the aortic valve on pullback.     CORONARY ANGIOGRAPHY:  Left main: Normal  Left anterior descending: Normal proximal mid and distal diagonals are normal  Ramus intermedius:Not present  Circumflex: Slight area of distal circumflex disease may be 20% but it also could just be a stepdown is a vessel bifurcates the remainder of the artery is normal  RCA: Is a dominant vessel.  Normal proximally mid and distal     SUMMARY: There is a small inferoapical wall motion abnormality but no real significant coronary disease and no evidence of vasospasm during the catheter she did have accelerated idioventricular rhythm and periods of ventricular bigeminy.  At the conclusion of the case ECG showed resolution of the ST elevation        RECOMMENDATIONS: A little bit of a confusing situation I think that a cardiac MRI here would greatly clarify what's going on possibly we have an area of myocarditis or could've had vasospasm with an infarct        Rishi Freeman MD  10/18/17  9:32 AM      Radiation      Event Details User   9:06 AM Radiation Tracking Cumulative Air Kerma: Total Dose (mGy) = 203.000  Physician: Rishi Freeman MD  Dose (mGy) =  203.000  Fluoro Time (mins) = 1.7 WK   Stent Inflated      Event Details User   No information to display   Balloon Inflated      Event Details User   No information to display   Medications   As of 10/18/17 1006   (Filter:  CV CONTRAST GROUPER Medications Shown)   iopamidol (ISOVUE-370) 76 % injection (mL)   Total dose:  73 mL    Dose Action Route Admin Date/Time    Admin User   73 mL Given Intra-arterial 10/18/17 0906  Rishi Freeman MD         Printable Result Report   Result Report    Encounter   View Encounter      PACS Images   Show images for Cardiac Catheterization/Vascular Study   Signed   Electronically signed by Rishi Freeman MD on 10/18/17 at 0936 EDT   Encounter-Level Cardiology Documents:   There are no encounter-level cardiology documents.    Link to Procedure Log   Procedure Log      Results Routing Tracking   View Results Routing Information   Cardiac Catheterization/Vascular Study [CATH01] (Order [id number])   Cardiac Cath   Date: 10/18/2017 Department: [address] [facility] Released By: Shasha Garcia Authorizing: Rishi Freeman MD   Order History   Inpatient   Date/Time Action Taken User Additional Information   10/18/17 0836 Release Shasha Garcia From Order: [id number]   10/18/17 0844 Result Rishi Degroot Jr. RN In process   10/18/17 0931 Result Rishi Freeman MD Preliminary   10/18/17 0943 Result Rishi Degroot Jr. RN Final   10/19/17 0536 Complete Abbey Bartlett    Order Details   Frequency Duration Priority Order Class   Once 1  occurrence Routine Hospital Performed   Start Date/Time   Start Date Start Time   10/18/17 0837   Procedures Performed   Code Procedure Name   CATH27 LEFT HEART CATH   CATH03 CORONARY ANGIOGRAPHY   CATH05 LEFT VENTRICULOGRAPHY   Completion Info   User Date/Time   Abbey Bartlett 10/19/17 0536   Acknowledgement Info   For At Acknowledged By Acknowledged On   Placing Order 10/18/17 0836 Shasha Garcia 10/18/17 0836   Reprint Order Requisition    Cardiac Catheterization/Vascular Study (Order #[id number]) on 10/18/17   Encounter-Level Cardiology Documents:   There are no encounter-level cardiology documents.   Encounter   View Encounter   Appointments for this Order   10/18/2017  [facility] Cath Lab   Order Provider Info       Office phone Pager E-mail   Ordering User Shasha Garcia -- -- --   Authorizing Provider Rishi Freeman [phone] -- --   Attending Provider Rishi Freeman [phone] -- --   Billing Provider Rishi Freeman [phone] -- --   Linked Charges   No charges linked to this procedure   Order Transmittal Tracking   Cardiac Catheterization/Vascular Study (Order #[id number]) on 10/18/17   Authorized by:  Rishi Freeman MD  (NPI: [id number])       Reviewed By List   Rishi Freeman MD on 10/18/2017 10:35 AM             Vital Signs (last 24 hours)       10/18 0700  -  10/19 0659 10/19 0700  -  10/19 1143   Most Recent    Temp (°F) 97.3 -  97.9    97.7 -  98.1     98.1 (36.7)    Heart Rate 63 -  75       75    Resp 16 -  18 18 18    /88 -  161/89    123/74 -  124/72     123/74    SpO2 (%) 92 -  98      98     98          Lines, Drains & Airways    Active LDAs     Name:   Placement date:   Placement time:   Site:   Days:    Peripheral IV Line - Single Lumen cephalic vein (lateral side of arm), right                     Inactive LDAs     Name:   Placement date:   Placement time:   Removal date:   Removal time:   Site:   Days:    [REMOVED] Peripheral IV Line - Single Lumen median cubital vein (antecubital fossa), right          10/18/17    0000          [REMOVED] Arterial Sheath 6 Fr. Right Radial  10/18/17    0853    10/18/17    0909    Radial    less than 1                Hospital Medications (active)       Dose Frequency Start End    acetaminophen (TYLENOL) tablet 650 mg 650 mg Every 6 Hours PRN 10/18/2017     Sig - Route: Take 2 tablets by mouth Every 6 (Six) Hours As Needed for Mild Pain . - Oral    calcium  "carbonate (TUMS) chewable tablet 500 mg (200 mg elemental) 2 tablet 2 Times Daily PRN 10/18/2017     Sig - Route: Chew 1,000 mg 2 (Two) Times a Day As Needed for Heartburn. - Oral    colchicine tablet 0.6 mg 0.6 mg Every 12 Hours Scheduled 10/19/2017     Sig - Route: Take 1 tablet by mouth Every 12 (Twelve) Hours. - Oral    gadobenate dimeglumine (MULTIHANCE) injection 18 mL 18 mL Once in Imaging 10/18/2017     Sig - Route: Infuse 18 mL into a venous catheter Once. - Intravenous    gadobenate dimeglumine (MULTIHANCE) injection 18 mL 18 mL Once in Imaging 10/18/2017     Sig - Route: Infuse 18 mL into a venous catheter Once. - Intravenous    gadobenate dimeglumine (MULTIHANCE) injection 18 mL 18 mL Once in Imaging 10/18/2017     Sig - Route: Infuse 18 mL into a venous catheter Once. - Intravenous    HYDROcodone-acetaminophen (NORCO) 5-325 MG per tablet 1 tablet 1 tablet Every 4 Hours PRN 10/18/2017 10/28/2017    Sig - Route: Take 1 tablet by mouth Every 4 (Four) Hours As Needed for Moderate Pain . - Oral    losartan (COZAAR) tablet 25 mg 25 mg Daily 10/18/2017     Sig - Route: Take 1 tablet by mouth Daily. - Oral    metoprolol tartrate (LOPRESSOR) tablet 50 mg 50 mg Every 12 Hours Scheduled 10/19/2017     Sig - Route: Take 1 tablet by mouth Every 12 (Twelve) Hours. - Oral    ondansetron (ZOFRAN) injection 4 mg 4 mg Every 6 Hours PRN 10/18/2017     Sig - Route: Infuse 2 mL into a venous catheter Every 6 (Six) Hours As Needed for Nausea or Vomiting. - Intravenous    Linked Group 1:  \"Or\" Linked Group Details        ondansetron (ZOFRAN) tablet 4 mg 4 mg Every 6 Hours PRN 10/18/2017     Sig - Route: Take 1 tablet by mouth Every 6 (Six) Hours As Needed for Nausea or Vomiting. - Oral    Linked Group 1:  \"Or\" Linked Group Details        ondansetron ODT (ZOFRAN-ODT) disintegrating tablet 4 mg 4 mg Every 6 Hours PRN 10/18/2017     Sig - Route: Take 1 tablet by mouth Every 6 (Six) Hours As Needed for Nausea or Vomiting. - Oral " "   Linked Group 1:  \"Or\" Linked Group Details        potassium chloride (MICRO-K) CR capsule 20 mEq 20 mEq Daily 10/18/2017     Sig - Route: Take 2 capsules by mouth Daily. - Oral    sodium chloride 0.9 % infusion 100 mL/hr Continuous 10/18/2017     Sig - Route: Infuse 100 mL/hr into a venous catheter Continuous. - Intravenous    temazepam (RESTORIL) capsule 15 mg 15 mg Nightly PRN 10/18/2017 10/28/2017    Sig - Route: Take 1 capsule by mouth At Night As Needed for Sleep. - Oral    colchicine tablet 0.6 mg (Discontinued) 0.6 mg Daily 10/18/2017 10/19/2017    Sig - Route: Take 1 tablet by mouth Daily. - Oral    metoprolol tartrate (LOPRESSOR) tablet 25 mg (Discontinued) 25 mg Every 12 Hours Scheduled 10/18/2017 10/19/2017    Sig - Route: Take 1 tablet by mouth Every 12 (Twelve) Hours. - Oral    metoprolol tartrate (LOPRESSOR) tablet 25 mg (Discontinued) 25 mg Every 12 Hours Scheduled 10/18/2017 10/19/2017    Sig - Route: Take 1 tablet by mouth Every 12 (Twelve) Hours. - Oral          Orders (last 24 hrs)     Start     Ordered    10/20/17 0600  Troponin  Morning Draw      10/19/17 0831    10/20/17 0600  Basic Metabolic Panel  Morning Draw      10/19/17 0831    10/20/17 0600  CBC & Differential  Morning Draw      10/19/17 0831    10/20/17 0500  ECG 12 Lead  Tomorrow AM      10/19/17 0831    10/19/17 0915  metoprolol tartrate (LOPRESSOR) tablet 50 mg  Every 12 Hours Scheduled      10/19/17 0831    10/19/17 0915  colchicine tablet 0.6 mg  Every 12 Hours Scheduled      10/19/17 0831    10/19/17 0600  CBC (No Diff)  Morning Draw      10/18/17 0922    10/19/17 0600  Basic Metabolic Panel  Morning Draw,   Status:  Canceled      10/18/17 0922    10/19/17 0600  Hemoglobin A1c  Morning Draw,   Status:  Canceled      10/18/17 0922    10/19/17 0600  Lipid Panel  Morning Draw,   Status:  Canceled     Comments:  Fasting    10/18/17 0922    10/19/17 0600  Comprehensive Metabolic Panel  Morning Draw,   Status:  Canceled      10/18/17 " 0922    10/19/17 0600  CBC (No Diff)  Morning Draw,   Status:  Canceled      10/18/17 0922    10/19/17 0600  BNP  Morning Draw,   Status:  Canceled      10/18/17 0922    10/19/17 0600  Troponin  Morning Draw,   Status:  Canceled      10/18/17 0922    10/19/17 0600  TSH  Morning Draw,   Status:  Canceled      10/18/17 0922    10/19/17 0600  Basic Metabolic Panel  Morning Draw      10/18/17 0925    10/19/17 0600  Troponin  Morning Draw      10/18/17 0925    10/19/17 0600  C-reactive Protein  Morning Draw      10/18/17 1627    10/19/17 0500  ECG 12 Lead  Tomorrow AM      10/18/17 0922    10/18/17 2100  metoprolol tartrate (LOPRESSOR) tablet 25 mg  Every 12 Hours Scheduled,   Status:  Discontinued      10/18/17 1627    10/18/17 1700  potassium chloride (MICRO-K) CR capsule 20 mEq  Daily      10/18/17 1622    10/18/17 1700  colchicine tablet 0.6 mg  Daily,   Status:  Discontinued      10/18/17 1627    10/18/17 1700  losartan (COZAAR) tablet 25 mg  Daily      10/18/17 1627    10/18/17 1623  ECG 12 Lead  Once      10/18/17 1622    10/18/17 1623  Troponin  Once,   Status:  Canceled      10/18/17 1622    10/18/17 1608  Respiratory Panel, PCR - Swab, Nasopharynx  Once      10/18/17 1607    10/18/17 1253  acetaminophen (TYLENOL) tablet 650 mg  Every 6 Hours PRN      10/18/17 1254    10/18/17 1230  gadobenate dimeglumine (MULTIHANCE) injection 18 mL  Once in Imaging      10/18/17 1157    10/18/17 1230  gadobenate dimeglumine (MULTIHANCE) injection 18 mL  Once in Imaging      10/18/17 1157    10/18/17 1230  gadobenate dimeglumine (MULTIHANCE) injection 18 mL  Once in Imaging      10/18/17 1157    10/18/17 1215  gadobenate dimeglumine (MULTIHANCE) injection 18 mL  Once in Imaging      10/18/17 1130    10/18/17 1200  Strict intake and output  Every 4 Hours      10/18/17 0922    10/18/17 1158  STAT Adult Transthoracic Echo Complete W/ Cont if Necessary Per Protocol  Once      10/18/17 1157    10/18/17 1158  MRI Cardiac Function  Complete With & Without Morphology  1 Time Imaging,   Status:  Canceled     Comments:  Also do a short axis stack with T2* imaging. Call me with any issues. Dr August    10/18/17 1157    10/18/17 1015  metoprolol tartrate (LOPRESSOR) tablet 25 mg  Every 12 Hours Scheduled,   Status:  Discontinued      10/18/17 0931    10/18/17 1000  sodium chloride 0.9 % infusion  Continuous      10/18/17 0922    10/18/17 0917  calcium carbonate (TUMS) chewable tablet 500 mg (200 mg elemental)  2 Times Daily PRN      10/18/17 0922    10/18/17 0917  ondansetron (ZOFRAN) tablet 4 mg  Every 6 Hours PRN      10/18/17 0922    10/18/17 0917  ondansetron ODT (ZOFRAN-ODT) disintegrating tablet 4 mg  Every 6 Hours PRN      10/18/17 0922    10/18/17 0917  ondansetron (ZOFRAN) injection 4 mg  Every 6 Hours PRN      10/18/17 0922    10/18/17 0917  temazepam (RESTORIL) capsule 15 mg  Nightly PRN      10/18/17 0922    10/18/17 0917  HYDROcodone-acetaminophen (NORCO) 5-325 MG per tablet 1 tablet  Every 4 Hours PRN      10/18/17 0922    Unscheduled  Vital Signs  As Needed      10/18/17 0922    Unscheduled  Check distal extremity for warmth, color, sensation and pulses with each vital sign and site check.  As Needed      10/18/17 0922    Unscheduled  Change site dressing  As Needed      10/18/17 0922                Physician Progress Notes (last 24 hours) (Notes from 10/18/2017 11:44 AM through 10/19/2017 11:44 AM)      Rishi Freeman MD at 10/19/2017  8:28 AM  Version 1 of 1         Kady Valiente  1985 32 y.o.  [id number]      Patient Care Team:  No Known Provider as PCP - General    CC: Focal myocarditis    Interval History: Doing well no further chest pain or shortness of breath      Objective   Vital Signs  Temp:  [97.3 °F (36.3 °C)-97.9 °F (36.6 °C)] 97.7 °F (36.5 °C)  Heart Rate:  [63-75] 75  Resp:  [16-18] 18  BP: (124-161)/(72-96) 124/72    Intake/Output Summary (Last 24 hours) at 10/19/17 0828  Last data filed at 10/18/17 0919    "Gross per 24 hour   Intake              400 ml   Output                0 ml   Net              400 ml     Flowsheet Rows         First Filed Value    Admission Height  69\" (175.3 cm) Documented at 10/18/2017 1153    Admission Weight  190 lb (86.2 kg) Documented at 10/18/2017 1153          Physical Exam:   General Appearance:    Alert,oriented, in no acute distress   Lungs:     Clear to auscultation,BS are equal    Heart:    Normal S1 and S2, RRR without murmur, gallop or rub   HEENT:    Sclera are clear, no JVD or adenopathy   Abdomen:     Normal bowel sounds, soft non-tender, non-distended, no HSM   Extremities:   Moves all extremities well, no edema, no cyanosis, no             Redness, no rash     Medication Review:        colchicine 0.6 mg Oral Daily   gadobenate dimeglumine 18 mL Intravenous Once in imaging   gadobenate dimeglumine 18 mL Intravenous Once in imaging   gadobenate dimeglumine 18 mL Intravenous Once in imaging   losartan 25 mg Oral Daily   metoprolol tartrate 25 mg Oral Q12H   metoprolol tartrate 25 mg Oral Q12H   potassium chloride 20 mEq Oral Daily       sodium chloride 100 mL/hr Last Rate: 100 mL/hr (10/18/17 1419)         I reviewed the patient's new clinical results.  I personally viewed and interpreted the patient's EKG/Telemetry data    Assessment/Plan  Active Hospital Problems (** Indicates Principal Problem)    Diagnosis Date Noted   • Myocarditis [I51.4] 10/18/2017      Resolved Hospital Problems    Diagnosis Date Noted Date Resolved   No resolved problems to display.       She's doing well I have started her on beta-blockade and losartan as well as a little bit of cultures seen I have not seen any significant arrhythmia I would like to watch her for another 24 hours I anticipate she'll make a complete recovery.    Rishi Freeman MD  10/19/17  8:28 AM               Electronically signed by Rishi Freeman MD at 10/19/2017  8:29 AM        "

## 2017-10-19 NOTE — PLAN OF CARE
Problem: Patient Care Overview (Adult)  Goal: Plan of Care Review  Outcome: Ongoing (interventions implemented as appropriate)  Goal: Adult Individualization and Mutuality  Outcome: Ongoing (interventions implemented as appropriate)  Goal: Discharge Needs Assessment  Outcome: Ongoing (interventions implemented as appropriate)    Problem: Cardiac Catheterization with/without PCI (Adult)  Goal: Signs and Symptoms of Listed Potential Problems Will be Absent or Manageable (Cardiac Catheterization with/without PCI)  Outcome: Ongoing (interventions implemented as appropriate)    Problem: Fall Risk (Adult)  Goal: Identify Related Risk Factors and Signs and Symptoms  Outcome: Ongoing (interventions implemented as appropriate)  Goal: Absence of Falls  Outcome: Ongoing (interventions implemented as appropriate)

## 2017-10-20 VITALS
DIASTOLIC BLOOD PRESSURE: 77 MMHG | OXYGEN SATURATION: 95 % | WEIGHT: 190 LBS | HEIGHT: 69 IN | RESPIRATION RATE: 18 BRPM | HEART RATE: 88 BPM | BODY MASS INDEX: 28.14 KG/M2 | TEMPERATURE: 97.4 F | SYSTOLIC BLOOD PRESSURE: 138 MMHG

## 2017-10-20 LAB
ANION GAP SERPL CALCULATED.3IONS-SCNC: 9.2 MMOL/L
BASOPHILS # BLD AUTO: 0.02 10*3/MM3 (ref 0–0.2)
BASOPHILS NFR BLD AUTO: 0.2 % (ref 0–1.5)
BUN BLD-MCNC: 10 MG/DL (ref 6–20)
BUN/CREAT SERPL: 13.3 (ref 7–25)
CALCIUM SPEC-SCNC: 8.5 MG/DL (ref 8.6–10.5)
CHLORIDE SERPL-SCNC: 103 MMOL/L (ref 98–107)
CO2 SERPL-SCNC: 27.8 MMOL/L (ref 22–29)
CREAT BLD-MCNC: 0.75 MG/DL (ref 0.57–1)
DEPRECATED RDW RBC AUTO: 45 FL (ref 37–54)
EOSINOPHIL # BLD AUTO: 0.51 10*3/MM3 (ref 0–0.7)
EOSINOPHIL NFR BLD AUTO: 4.3 % (ref 0.3–6.2)
ERYTHROCYTE [DISTWIDTH] IN BLOOD BY AUTOMATED COUNT: 13.6 % (ref 11.7–13)
GFR SERPL CREATININE-BSD FRML MDRD: 90 ML/MIN/1.73
GLUCOSE BLD-MCNC: 90 MG/DL (ref 65–99)
HCT VFR BLD AUTO: 43.1 % (ref 35.6–45.5)
HGB BLD-MCNC: 13.6 G/DL (ref 11.9–15.5)
IMM GRANULOCYTES # BLD: 0.03 10*3/MM3 (ref 0–0.03)
IMM GRANULOCYTES NFR BLD: 0.3 % (ref 0–0.5)
LYMPHOCYTES # BLD AUTO: 1.95 10*3/MM3 (ref 0.9–4.8)
LYMPHOCYTES NFR BLD AUTO: 16.6 % (ref 19.6–45.3)
MCH RBC QN AUTO: 29.1 PG (ref 26.9–32)
MCHC RBC AUTO-ENTMCNC: 31.6 G/DL (ref 32.4–36.3)
MCV RBC AUTO: 92.1 FL (ref 80.5–98.2)
MONOCYTES # BLD AUTO: 0.81 10*3/MM3 (ref 0.2–1.2)
MONOCYTES NFR BLD AUTO: 6.9 % (ref 5–12)
NEUTROPHILS # BLD AUTO: 8.44 10*3/MM3 (ref 1.9–8.1)
NEUTROPHILS NFR BLD AUTO: 71.7 % (ref 42.7–76)
PLATELET # BLD AUTO: 232 10*3/MM3 (ref 140–500)
PMV BLD AUTO: 10.8 FL (ref 6–12)
POTASSIUM BLD-SCNC: 3.9 MMOL/L (ref 3.5–5.2)
RBC # BLD AUTO: 4.68 10*6/MM3 (ref 3.9–5.2)
SODIUM BLD-SCNC: 140 MMOL/L (ref 136–145)
TROPONIN T SERPL-MCNC: 0.81 NG/ML (ref 0–0.03)
WBC NRBC COR # BLD: 11.76 10*3/MM3 (ref 4.5–10.7)

## 2017-10-20 PROCEDURE — 85025 COMPLETE CBC W/AUTO DIFF WBC: CPT | Performed by: INTERNAL MEDICINE

## 2017-10-20 PROCEDURE — 99238 HOSP IP/OBS DSCHRG MGMT 30/<: CPT | Performed by: INTERNAL MEDICINE

## 2017-10-20 PROCEDURE — 80048 BASIC METABOLIC PNL TOTAL CA: CPT | Performed by: INTERNAL MEDICINE

## 2017-10-20 PROCEDURE — 84484 ASSAY OF TROPONIN QUANT: CPT | Performed by: INTERNAL MEDICINE

## 2017-10-20 PROCEDURE — 93010 ELECTROCARDIOGRAM REPORT: CPT | Performed by: INTERNAL MEDICINE

## 2017-10-20 PROCEDURE — 93005 ELECTROCARDIOGRAM TRACING: CPT | Performed by: INTERNAL MEDICINE

## 2017-10-20 RX ORDER — COLCHICINE 0.6 MG/1
0.6 TABLET ORAL DAILY
Qty: 30 TABLET | Refills: 3 | Status: CANCELLED | OUTPATIENT
Start: 2017-10-20

## 2017-10-20 RX ORDER — COLCHICINE 0.6 MG/1
0.6 TABLET ORAL DAILY
Status: DISCONTINUED | OUTPATIENT
Start: 2017-10-20 | End: 2017-10-20 | Stop reason: HOSPADM

## 2017-10-20 RX ORDER — COLCHICINE 0.6 MG/1
0.6 TABLET ORAL DAILY
Qty: 30 TABLET | Refills: 3 | Status: SHIPPED | OUTPATIENT
Start: 2017-10-20 | End: 2017-11-01 | Stop reason: SDUPTHER

## 2017-10-20 RX ORDER — LOSARTAN POTASSIUM 25 MG/1
25 TABLET ORAL DAILY
Qty: 30 TABLET | Refills: 6 | Status: SHIPPED | OUTPATIENT
Start: 2017-10-20 | End: 2018-05-21 | Stop reason: SDUPTHER

## 2017-10-20 RX ORDER — METOPROLOL TARTRATE 50 MG/1
50 TABLET, FILM COATED ORAL EVERY 12 HOURS SCHEDULED
Qty: 60 TABLET | Refills: 6 | Status: SHIPPED | OUTPATIENT
Start: 2017-10-20 | End: 2018-05-31

## 2017-10-20 RX ADMIN — COLCHICINE 0.6 MG: 0.6 TABLET, FILM COATED ORAL at 13:09

## 2017-10-20 RX ADMIN — METOPROLOL TARTRATE 50 MG: 25 TABLET ORAL at 09:46

## 2017-10-20 RX ADMIN — POTASSIUM CHLORIDE 20 MEQ: 750 CAPSULE, EXTENDED RELEASE ORAL at 09:46

## 2017-10-20 RX ADMIN — LOSARTAN POTASSIUM 25 MG: 25 TABLET, FILM COATED ORAL at 09:46

## 2017-10-20 NOTE — PLAN OF CARE
Problem: Patient Care Overview (Adult)  Goal: Plan of Care Review  Outcome: Ongoing (interventions implemented as appropriate)    10/20/17 0256   Coping/Psychosocial Response Interventions   Plan Of Care Reviewed With patient   Patient Care Overview   Progress improving   Outcome Evaluation   Outcome Summary/Follow up Plan Patient denies any complaints at this time. appears to be resting well. patient remains in SR on the monitor. Will continue to monitor. probable d/c later this am.

## 2017-10-20 NOTE — PLAN OF CARE
Problem: Patient Care Overview (Adult)  Goal: Plan of Care Review  Outcome: Outcome(s) achieved Date Met:  10/20/17    10/20/17 9500   Coping/Psychosocial Response Interventions   Plan Of Care Reviewed With patient   Patient Care Overview   Progress improving   Outcome Evaluation   Outcome Summary/Follow up Plan Patient being discharged. Family at bed to take patient home.

## 2017-10-20 NOTE — DISCHARGE SUMMARY
Kady Valiente  [id number]    Date of Admit: 10/18/2017  Date of Discharge:  10/20/2017    Discharge Diagnosis:  Active Hospital Problems (** Indicates Principal Problem)    Diagnosis Date Noted   • Myocarditis [I51.4] 10/18/2017      Resolved Hospital Problems    Diagnosis Date Noted Date Resolved   No resolved problems to display.           Hospital Course: She presented with sudden onset of chest pain and 911 was called. Original ECG showed inferior and lateral ST elevation consistent with STEMI . She had no risk factors for coronary disease. She was choppered to [facility] and we took her directly to the catheterization lab. At that time, catheterization revealed essentially normal coronaries. There was a little bit of tapering of the distal circumflex. There was a small inferoapical wall motion abnormality, but the overall LV function was normal. We took her directly for MRI of the heart, and this showed a small area of inferoapical myocarditis. We felt she probably had a focal myocarditis. One could see that there was some evolution of her ECG and that there were some inferior changes on the ECG over time. She has been stable. We have started her on colchicine for anti-inflammatory. We have also started her on losartan and beta blockade. I would like her to come back and see Patti in about 10 days and then see me in a month. I think that she will eventually heal everything up and totally recover. I do not anticipate long-term issues with this.         Procedures Performed  Procedure(s):  Left Heart Cath  Coronary angiography  Left ventriculography       Consults     No orders found from 9/19/2017 to 10/19/2017.          Discharge Medications     Your medication list      START taking these medications       Instructions Last Dose Given Next Dose Due    colchicine 0.6 MG tablet        Take 1 tablet by mouth Daily.         losartan 25 MG tablet   Commonly known as:  COZAAR        Take 1 tablet  by mouth Daily.         metoprolol tartrate 50 MG tablet   Commonly known as:  LOPRESSOR        Take 1 tablet by mouth Every 12 (Twelve) Hours.              Where to Get Your Medications      These medications were sent to [facility] [address] [place] [address] AT [address]  & [address] [facility] - [phone] [facility] [phone] [address] [address]     Phone:  [phone]    • colchicine 0.6 MG tablet   • losartan 25 MG tablet   • metoprolol tartrate 50 MG tablet             Discharge Diet:     Activity at Discharge:     Discharge disposition: home    Condition on Discharge: stable    Follow-up Appointments  No future appointments.  Additional Instructions for the Follow-ups that You Need to Schedule     Discharge Follow-up with Specialty    As directed    Specialty:  Follow up with Adriana Mendez in 10 days and with Dr. Freeman in 4 weeks                 Test Results Pending at Discharge       Rishi Freeman MD  10/20/17  9:57 AM

## 2017-10-24 ENCOUNTER — TELEPHONE (OUTPATIENT)
Dept: CARDIOLOGY | Facility: CLINIC | Age: 32
End: 2017-10-24

## 2017-10-24 NOTE — TELEPHONE ENCOUNTER
----- Message from Shane Sofia sent at 10/24/2017 10:01 AM EDT -----  Regarding: FW: 10 day follow up with PC, 1 mo- WD  I need a 1 m follow up with WD  ----- Message -----     From: Lisa Ernst RN     Sent: 10/20/2017   9:04 AM       To: Shane Sofia  Subject: 10 day follow up with PC, 1 mo- WD               Gabriela is not available- can you help?    Inpatient being discharged today for myocarditis- needs a 10 day follow up with Patti and a 1 month follow up with Dr. Freeman.    Thanks

## 2017-11-01 ENCOUNTER — OFFICE VISIT (OUTPATIENT)
Dept: CARDIOLOGY | Facility: CLINIC | Age: 32
End: 2017-11-01

## 2017-11-01 VITALS
HEIGHT: 69 IN | OXYGEN SATURATION: 99 % | BODY MASS INDEX: 32.73 KG/M2 | DIASTOLIC BLOOD PRESSURE: 80 MMHG | WEIGHT: 221 LBS | SYSTOLIC BLOOD PRESSURE: 130 MMHG | HEART RATE: 59 BPM

## 2017-11-01 DIAGNOSIS — I40.1 ACUTE IDIOPATHIC MYOCARDITIS: Primary | ICD-10-CM

## 2017-11-01 PROCEDURE — 99213 OFFICE O/P EST LOW 20 MIN: CPT | Performed by: PHYSICIAN ASSISTANT

## 2017-11-01 PROCEDURE — 93000 ELECTROCARDIOGRAM COMPLETE: CPT | Performed by: PHYSICIAN ASSISTANT

## 2017-11-01 RX ORDER — COLCHICINE 0.6 MG/1
0.6 TABLET ORAL DAILY
Qty: 30 TABLET | Refills: 3 | Status: SHIPPED | OUTPATIENT
Start: 2017-11-01 | End: 2017-11-03

## 2017-11-01 NOTE — PROGRESS NOTES
Date of Office Visit: 2017  Encounter Provider: SCOOTER Jimenez  Place of Service: [facility] CARDIOLOGY  Patient Name: Kady Valiente  :1985    Chief Complaint   Patient presents with   • Coronary Artery Disease     1 week hospital follow up   :     HPI: Kady Valiente is a 32 y.o. female , new to me, who presents today for follow-up.  Old records have been obtained and reviewed by me.  She is a patient with really no prior medical or cardiac history.  On 10/18/2017 she presented with sudden onset of acute chest pain.  EMS was called, and an ECG in the field showed inferior and lateral ST elevation consistent with a STEMI.  She was taken emergently for cardiac catheterization, which showed essentially normal coronary arteries.  She was then taken directly for an MRI of the heart, which showed a small area of inferoapical myocarditis.  It was felt that she had a focal myocarditis.  Overall she had normal LV function with an EF of 57.3% and no significant valvular abnormalities.  She was started on colchicine for anti-inflammatory purposes, as well as olmesartan and a beta-blockade.  She was discharged home on 10/20/2017 in stable condition and is here today for follow-up.   Since she's been home she's been doing great.  All of her symptoms are starting to improve.  She no longer has any chest pain.  She has shortness of breath on exertion, however this is improving as well.  She is fatigued, however this is improving.  She also has a 2-year-old and a 2-month-old at home to take care of.  She has been recruiting a lot of help from her family since she's been home from the hospital.  For some reason she only had 3 days worth of colchicine, and so she has not been taking it except for the 3 days she got home from the hospital.      Past Medical History:   Diagnosis Date   • Hypertension    • Myocarditis        Past Surgical History:   Procedure Laterality Date   •  CARDIAC CATHETERIZATION N/A 10/18/2017    Procedure: Left Heart Cath;  Surgeon: Rishi Freeman MD;  Location: [facility] CATH INVASIVE LOCATION;  Service:    • CARDIAC CATHETERIZATION N/A 10/18/2017    Procedure: Coronary angiography;  Surgeon: Rishi Freeman MD;  Location: [facility] CATH INVASIVE LOCATION;  Service:    • CARDIAC CATHETERIZATION N/A 10/18/2017    Procedure: Left ventriculography;  Surgeon: Rishi Freeman MD;  Location: [facility] INVASIVE LOCATION;  Service:        Social History     Social History   • Marital status:      Spouse name: N/A   • Number of children: N/A   • Years of education: N/A     Occupational History   • Not on file.     Social History Main Topics   • Smoking status: Never Smoker   • Smokeless tobacco: Never Used   • Alcohol use 0.6 oz/week     1 Glasses of wine per week      Comment: occ   • Drug use: No   • Sexual activity: Defer     Other Topics Concern   • Not on file     Social History Narrative       Family History   Problem Relation Age of Onset   • Emphysema Mother    • COPD Mother    • No Known Problems Father    • No Known Problems Maternal Grandmother    • No Known Problems Maternal Grandfather    • No Known Problems Paternal Grandmother    • No Known Problems Paternal Grandfather        Review of Systems   Constitution: Positive for malaise/fatigue. Negative for chills, fever, weight gain and weight loss.   HENT: Negative for ear pain, hearing loss, nosebleeds and sore throat.    Eyes: Negative for double vision, pain and visual disturbance.   Cardiovascular: Positive for dyspnea on exertion. Negative for chest pain, irregular heartbeat, leg swelling, near-syncope, orthopnea, palpitations, paroxysmal nocturnal dyspnea and syncope.   Respiratory: Negative for cough, shortness of breath, sleep disturbances due to breathing, snoring and wheezing.    Endocrine: Negative for cold intolerance, heat intolerance and polyuria.   Skin: Negative for itching and rash.  "  Musculoskeletal: Negative for joint pain, joint swelling and myalgias.   Gastrointestinal: Negative for abdominal pain, diarrhea, melena, nausea and vomiting.   Genitourinary: Negative for frequency, hematuria and hesitancy.   Neurological: Negative for excessive daytime sleepiness, headaches, light-headedness, numbness, paresthesias and seizures.   Psychiatric/Behavioral: Negative for altered mental status and depression.   Allergic/Immunologic: Negative.    All other systems reviewed and are negative.      Allergies   Allergen Reactions   • Penicillins    • Prednisone          Current Outpatient Prescriptions:   •  colchicine 0.6 MG tablet, Take 1 tablet by mouth Daily., Disp: 30 tablet, Rfl: 3  •  losartan (COZAAR) 25 MG tablet, Take 1 tablet by mouth Daily., Disp: 30 tablet, Rfl: 6  •  metoprolol tartrate (LOPRESSOR) 50 MG tablet, Take 1 tablet by mouth Every 12 (Twelve) Hours., Disp: 60 tablet, Rfl: 6     Objective:     Vitals:    11/01/17 0953 11/01/17 1006   BP: 132/82 130/80   BP Location: Right arm Left arm   Pulse: 59    SpO2: 99%    Weight: 221 lb (100 kg)    Height: 69\" (175.3 cm)      Body mass index is 32.64 kg/(m^2).    PHYSICAL EXAM:    Physical Exam   Constitutional: She is oriented to person, place, and time. She appears well-developed and well-nourished. No distress.   HENT:   Head: Normocephalic and atraumatic.   Eyes: Pupils are equal, round, and reactive to light.   Neck: No JVD present. No thyromegaly present.   Cardiovascular: Normal rate, regular rhythm, normal heart sounds and intact distal pulses.    No murmur heard.  Pulmonary/Chest: Effort normal and breath sounds normal. No respiratory distress.   Abdominal: Soft. Bowel sounds are normal. She exhibits no distension. There is no splenomegaly or hepatomegaly. There is no tenderness.   Musculoskeletal: Normal range of motion. She exhibits no edema.   Neurological: She is alert and oriented to person, place, and time.   Skin: Skin is warm " and dry. She is not diaphoretic. No erythema.   Psychiatric: She has a normal mood and affect. Her behavior is normal. Judgment normal.         ECG 12 Lead  Date/Time: 11/1/2017 10:19 AM  Performed by: BERTIN MENDEZ.  Authorized by: BERTIN MENDEZ   Comparison: compared with previous ECG from 10/20/2017  Similar to previous ECG  Rhythm: sinus rhythm  BPM: 59  T depression: II, III, aVF, V5 and V6  Q waves: II, III, aVF, V5 and V6  Clinical impression: abnormal ECG  Comments: Indication: Myocarditis.    When compared to prior ECG, there appears to be further evolution of infarct in the inferior and lateral leads.              Assessment:       Diagnosis Plan   1. Acute idiopathic myocarditis  ECG 12 Lead     Orders Placed This Encounter   Procedures   • ECG 12 Lead     This order was created via procedure documentation          Plan:       This is a young patient with really no prior medical history with an acute onset of myocarditis.  She was started on a beta blocker and losartan, as well as colchicine.  For some reason she was only given 3 pills of colchicine.  I have sent in a refill for her.  Overall her symptoms are significantly improved.  I think that she can start to slowly get back to her daily activities.  I've asked her to really eats herself back in 2 light activity.  She indicated that she understood.  She will follow-up with Dr. Freeman on 11/28/2017 or sooner if needed.  I've also instructed her to call our office if she develops worsening symptoms.  She indicated that she understood.    As always, it has been a pleasure to participate in your patient's care.      Sincerely,         Bertin Mendez PA-C

## 2017-11-03 RX ORDER — COLCHICINE 0.6 MG/1
0.6 TABLET ORAL DAILY
Qty: 30 TABLET | Refills: 6 | Status: SHIPPED | OUTPATIENT
Start: 2017-11-03 | End: 2018-05-31

## 2017-11-28 ENCOUNTER — OFFICE VISIT (OUTPATIENT)
Dept: CARDIOLOGY | Facility: CLINIC | Age: 32
End: 2017-11-28

## 2017-11-28 VITALS
BODY MASS INDEX: 32.97 KG/M2 | HEIGHT: 69 IN | DIASTOLIC BLOOD PRESSURE: 78 MMHG | HEART RATE: 67 BPM | SYSTOLIC BLOOD PRESSURE: 140 MMHG | WEIGHT: 222.6 LBS

## 2017-11-28 DIAGNOSIS — I40.1 ACUTE IDIOPATHIC MYOCARDITIS: Primary | ICD-10-CM

## 2017-11-28 PROCEDURE — 99214 OFFICE O/P EST MOD 30 MIN: CPT | Performed by: INTERNAL MEDICINE

## 2017-11-28 PROCEDURE — 93000 ELECTROCARDIOGRAM COMPLETE: CPT | Performed by: INTERNAL MEDICINE

## 2017-11-28 NOTE — PROGRESS NOTES
Date of Office Visit: 2017  Encounter Provider: Rishi Freeman MD  Place of Service: [facility] CARDIOLOGY  Patient Name: Kady Valiente  :1985  [id number]    Chief Complaint   Patient presents with   • myocarditis   :     HPI: Kady Valiente is a 32 y.o. female  She is a lady who had myocarditis.  We ended up taking her to the catheterization laboratory.  It looked like she might be having a STEMI but everything was good.  We had an MRI with documented myocarditis on it.  She has done well since then.  She had a couple of episodes of chest pain early on, but has had nothing after that.  She has otherwise been fine.  No chest pain, shortness of breath, PND, orthopnea, edema, syncope or palpitations.          Past Medical History:   Diagnosis Date   • Hypertension    • Myocarditis        Past Surgical History:   Procedure Laterality Date   • CARDIAC CATHETERIZATION N/A 10/18/2017    Procedure: Left Heart Cath;  Surgeon: Rishi Freeman MD;  Location: [facility] INVASIVE LOCATION;  Service:    • CARDIAC CATHETERIZATION N/A 10/18/2017    Procedure: Coronary angiography;  Surgeon: Rishi Freeman MD;  Location: [facility] CATH INVASIVE LOCATION;  Service:    • CARDIAC CATHETERIZATION N/A 10/18/2017    Procedure: Left ventriculography;  Surgeon: Rishi Freeman MD;  Location: [facility] INVASIVE LOCATION;  Service:        Social History     Social History   • Marital status:      Spouse name: N/A   • Number of children: N/A   • Years of education: N/A     Occupational History   • Not on file.     Social History Main Topics   • Smoking status: Never Smoker   • Smokeless tobacco: Never Used   • Alcohol use 0.6 oz/week     1 Glasses of wine per week      Comment: occ   • Drug use: No   • Sexual activity: Defer     Other Topics Concern   • Not on file     Social History Narrative       Family History   Problem Relation Age of Onset   • Emphysema Mother    • COPD Mother   "  • No Known Problems Father    • No Known Problems Maternal Grandmother    • No Known Problems Maternal Grandfather    • No Known Problems Paternal Grandmother    • No Known Problems Paternal Grandfather        Review of Systems   Constitution: Negative for decreased appetite, fever, malaise/fatigue and weight loss.   HENT: Negative for nosebleeds.    Eyes: Negative for double vision.   Cardiovascular: Negative for chest pain, claudication, cyanosis, dyspnea on exertion, irregular heartbeat, leg swelling, near-syncope, orthopnea, palpitations, paroxysmal nocturnal dyspnea and syncope.   Respiratory: Negative for cough, hemoptysis and shortness of breath.    Hematologic/Lymphatic: Negative for bleeding problem.   Skin: Negative for rash.   Musculoskeletal: Negative for falls and myalgias.   Gastrointestinal: Negative for hematochezia, jaundice, melena, nausea and vomiting.   Genitourinary: Negative for hematuria.   Neurological: Negative for dizziness and seizures.   Psychiatric/Behavioral: Negative for altered mental status and memory loss.       Allergies   Allergen Reactions   • Penicillins    • Prednisone          Current Outpatient Prescriptions:   •  colchicine (COLCRYS) 0.6 MG tablet, Take 1 tablet by mouth Daily., Disp: 30 tablet, Rfl: 6  •  IRON CR PO, Take  by mouth., Disp: , Rfl:   •  losartan (COZAAR) 25 MG tablet, Take 1 tablet by mouth Daily., Disp: 30 tablet, Rfl: 6  •  metoprolol tartrate (LOPRESSOR) 50 MG tablet, Take 1 tablet by mouth Every 12 (Twelve) Hours., Disp: 60 tablet, Rfl: 6     Objective:     Vitals:    11/28/17 1439   BP: 140/78   Pulse: 67   Weight: 222 lb 9.6 oz (101 kg)   Height: 69\" (175.3 cm)     Body mass index is 32.87 kg/(m^2).    Physical Exam   Constitutional: She is oriented to person, place, and time. She appears well-developed and well-nourished.   HENT:   Head: Normocephalic.   Eyes: No scleral icterus.   Neck: No JVD present. No thyromegaly present.   Cardiovascular: " Normal rate, regular rhythm and normal heart sounds.  Exam reveals no gallop and no friction rub.    No murmur heard.  Pulmonary/Chest: Effort normal and breath sounds normal. She has no wheezes. She has no rales.   Abdominal: Soft. There is no hepatosplenomegaly. There is no tenderness.   Musculoskeletal: Normal range of motion. She exhibits no edema.   Lymphadenopathy:     She has no cervical adenopathy.   Neurological: She is alert and oriented to person, place, and time.   Skin: Skin is warm and dry. No rash noted.   Psychiatric: She has a normal mood and affect.         ECG 12 Lead  Date/Time: 11/28/2017 3:20 PM  Performed by: CARROLL FREEMAN  Authorized by: CARROLL FREEMAN   Comparison: compared with previous ECG   Rhythm: sinus rhythm  Clinical impression: abnormal ECG  Comments: Inferior ST-T changes in the anterolateral changes of resolved             Assessment:       Diagnosis Plan   1. Acute idiopathic myocarditis            Plan:       She had an episode of focal inferior myocarditis the produced chest discomfort abnormal ECG troponin rise.  We are going to treat her for 6 months with cultures seen an abnormal routine, cardiomyopathy meds although she has normal LV function her blood pressure still is a little generous I talked with her about trying to lose some weight and I think if her weight comes down she will probably lower her blood pressure and we may be able to stop some of these medicines I we'll see her back in 6 months    As always, it has been a pleasure to participate in your patient's care.      Sincerely,       Carroll Freeman MD

## 2017-12-20 ENCOUNTER — TELEPHONE (OUTPATIENT)
Dept: CARDIOLOGY | Facility: CLINIC | Age: 32
End: 2017-12-20

## 2017-12-20 NOTE — TELEPHONE ENCOUNTER
Patient calling said her hair is falling out. She said she looked up side effects and she believes its from the metoprolol    [phone]

## 2017-12-21 NOTE — TELEPHONE ENCOUNTER
It could cause hair loss and I told her to wean the metoprolol off and stop it blood pressures been good she says otherwise she is doing well

## 2018-05-21 RX ORDER — LOSARTAN POTASSIUM 25 MG/1
TABLET ORAL
Qty: 90 TABLET | Refills: 2 | Status: SHIPPED | OUTPATIENT
Start: 2018-05-21 | End: 2018-07-26 | Stop reason: SDUPTHER

## 2018-05-31 ENCOUNTER — OFFICE VISIT (OUTPATIENT)
Dept: CARDIOLOGY | Facility: CLINIC | Age: 33
End: 2018-05-31

## 2018-05-31 VITALS
OXYGEN SATURATION: 100 % | HEIGHT: 69 IN | WEIGHT: 226 LBS | SYSTOLIC BLOOD PRESSURE: 140 MMHG | BODY MASS INDEX: 33.47 KG/M2 | HEART RATE: 73 BPM | DIASTOLIC BLOOD PRESSURE: 86 MMHG

## 2018-05-31 DIAGNOSIS — I10 ESSENTIAL HYPERTENSION: Primary | ICD-10-CM

## 2018-05-31 DIAGNOSIS — I40.1 ACUTE IDIOPATHIC MYOCARDITIS: ICD-10-CM

## 2018-05-31 PROCEDURE — 93000 ELECTROCARDIOGRAM COMPLETE: CPT | Performed by: PHYSICIAN ASSISTANT

## 2018-05-31 PROCEDURE — 99213 OFFICE O/P EST LOW 20 MIN: CPT | Performed by: PHYSICIAN ASSISTANT

## 2018-07-26 RX ORDER — LOSARTAN POTASSIUM 25 MG/1
25 TABLET ORAL DAILY
Qty: 90 TABLET | Refills: 2 | Status: SHIPPED | OUTPATIENT
Start: 2018-07-26

## 2018-10-04 ENCOUNTER — OFFICE VISIT (OUTPATIENT)
Dept: CARDIOLOGY | Facility: CLINIC | Age: 33
End: 2018-10-04

## 2018-10-04 VITALS
WEIGHT: 218.8 LBS | SYSTOLIC BLOOD PRESSURE: 120 MMHG | HEART RATE: 77 BPM | BODY MASS INDEX: 32.41 KG/M2 | HEIGHT: 69 IN | DIASTOLIC BLOOD PRESSURE: 90 MMHG

## 2018-10-04 DIAGNOSIS — R00.2 PALPITATIONS: ICD-10-CM

## 2018-10-04 DIAGNOSIS — I10 ESSENTIAL HYPERTENSION: Primary | ICD-10-CM

## 2018-10-04 DIAGNOSIS — I40.1 ACUTE IDIOPATHIC MYOCARDITIS: ICD-10-CM

## 2018-10-04 PROCEDURE — 93000 ELECTROCARDIOGRAM COMPLETE: CPT | Performed by: PHYSICIAN ASSISTANT

## 2018-10-04 PROCEDURE — 99213 OFFICE O/P EST LOW 20 MIN: CPT | Performed by: PHYSICIAN ASSISTANT

## 2018-10-04 NOTE — PROGRESS NOTES
Date of Office Visit: 10/04/2018  Encounter Provider: SCOOTER Franco  Place of Service: [facility] CARDIOLOGY  Patient Name: Kady Valiente  :1985    Chief Complaint   Patient presents with   • Chest Pain   :     HPI: Kady Valiente is a 33 y.o. female who presents today for follow-up.  Old records have been obtained and reviewed by me.  She is a patient with really no prior medical or cardiac history.  On 10/18/2017 she presented with sudden onset of acute chest pain.  EMS was called, and an ECG in the field showed inferior and lateral ST elevation consistent with a STEMI.  She was taken emergently for cardiac catheterization, which showed essentially normal coronary arteries.  She was then taken directly for an MRI of the heart, which showed a small area of inferoapical myocarditis.  It was felt that she had a focal myocarditis.  Overall she had normal LV function with an EF of 57.3% and no significant valvular abnormalities.  She was started on colchicine for anti-inflammatory purposes, as well as an ARB and beta-blockade.   She was last in our office to see me on 2018.  At that visit she was doing great.  She was exercising and had lost 10 pounds.  Her blood pressure was fantastic.  My recommendation was to discontinue her losartan 25 mg daily if her blood pressure remained below 120 systolic.  I also discontinued the colchicine.  She is here today sooner than her follow-up appointment with complaints of hypertension.  She states that for the past 3 weeks he's been having palpitations every day.  She also has a lot of anxiety.  She has a 3-year-old, a 1-year-old, and she is the mayor of a small town.  She is currently running for re-election.  She denies any chest pain, shortness of breath, edema, dizziness, or syncope.  She is still exercising every day.  She does not drink caffeine or alcohol.  She has lost another 10 pounds intentionally.  Her blood  pressure at home has been stable and within normal limits.    Past Medical History:   Diagnosis Date   • Hypertension    • Myocarditis (CMS/HCC)        Past Surgical History:   Procedure Laterality Date   • CARDIAC CATHETERIZATION N/A 10/18/2017    Procedure: Left Heart Cath;  Surgeon: Rishi Freeman MD;  Location: [facility] CATH INVASIVE LOCATION;  Service:    • CARDIAC CATHETERIZATION N/A 10/18/2017    Procedure: Coronary angiography;  Surgeon: Rishi Freeman MD;  Location: [facility] CATH INVASIVE LOCATION;  Service:    • CARDIAC CATHETERIZATION N/A 10/18/2017    Procedure: Left ventriculography;  Surgeon: Rishi Freeman MD;  Location: [facility] CATH INVASIVE LOCATION;  Service:        Social History     Social History   • Marital status:      Spouse name: N/A   • Number of children: N/A   • Years of education: N/A     Occupational History   • Not on file.     Social History Main Topics   • Smoking status: Never Smoker   • Smokeless tobacco: Never Used   • Alcohol use 0.6 oz/week     1 Glasses of wine per week      Comment: occ   • Drug use: No   • Sexual activity: Defer     Other Topics Concern   • Not on file     Social History Narrative   • No narrative on file       Family History   Problem Relation Age of Onset   • Emphysema Mother    • COPD Mother    • No Known Problems Father    • No Known Problems Maternal Grandmother    • No Known Problems Maternal Grandfather    • No Known Problems Paternal Grandmother    • No Known Problems Paternal Grandfather        Review of Systems   Constitution: Negative for chills, fever and malaise/fatigue.   Cardiovascular: Positive for palpitations. Negative for chest pain, dyspnea on exertion, leg swelling, near-syncope, orthopnea, paroxysmal nocturnal dyspnea and syncope.   Respiratory: Negative for cough and shortness of breath.    Musculoskeletal: Negative for joint pain, joint swelling and myalgias.   Gastrointestinal: Negative for abdominal pain, diarrhea, melena,  "nausea and vomiting.   Genitourinary: Negative for frequency and hematuria.   Neurological: Negative for light-headedness, numbness, paresthesias and seizures.   Psychiatric/Behavioral: The patient is nervous/anxious.    Allergic/Immunologic: Negative.    All other systems reviewed and are negative.      Allergies   Allergen Reactions   • Penicillins    • Prednisone          Current Outpatient Prescriptions:   •  losartan (COZAAR) 25 MG tablet, Take 1 tablet by mouth Daily., Disp: 90 tablet, Rfl: 2      Objective:     Vitals:    10/04/18 1229 10/04/18 1239   BP: 140/100 120/90   BP Location: Right arm Left arm   Pulse: 77    Weight: 99.2 kg (218 lb 12.8 oz)    Height: 175.3 cm (69\")      Body mass index is 32.31 kg/m².    PHYSICAL EXAM:    Physical Exam   Constitutional: She is oriented to person, place, and time. She appears well-developed and well-nourished. No distress.   HENT:   Head: Normocephalic and atraumatic.   Eyes: Pupils are equal, round, and reactive to light.   Neck: No JVD present. No thyromegaly present.   Cardiovascular: Normal rate, regular rhythm, normal heart sounds and intact distal pulses.    No murmur heard.  Pulmonary/Chest: Effort normal and breath sounds normal. No respiratory distress.   Abdominal: Soft. Bowel sounds are normal. She exhibits no distension. There is no splenomegaly or hepatomegaly. There is no tenderness.   Musculoskeletal: Normal range of motion. She exhibits no edema.   Neurological: She is alert and oriented to person, place, and time.   Skin: Skin is warm and dry. She is not diaphoretic. No erythema.   Psychiatric: She has a normal mood and affect. Her behavior is normal. Judgment normal.         ECG 12 Lead  Date/Time: 10/4/2018 12:49 PM  Performed by: BERTIN JAMES  Authorized by: BERTIN JAMES   Comparison: compared with previous ECG from 5/31/2018  Similar to previous ECG  Rhythm: sinus rhythm  BPM: 77  Clinical impression: normal ECG  Comments: Indication: " Hypertension, myocarditis.              Assessment:       Diagnosis Plan   1. Essential hypertension  ECG 12 Lead   2. Acute idiopathic myocarditis  ECG 12 Lead   3. Palpitations  Holter Monitor - 48 Hour     Orders Placed This Encounter   Procedures   • Holter Monitor - 48 Hour     Standing Status:   Future     Standing Expiration Date:   10/4/2019     Order Specific Question:   Reason for exam?     Answer:   Palpitations   • ECG 12 Lead     This order was created via procedure documentation          Plan:       1.  Hypertension.  Her blood pressure is a little elevated in her right arm today, however at home she states that it is well-controlled.  Continue current medical regimen.      2.  Myocarditis.  She is doing extremely well from this standpoint, she has no symptoms of recurring myocarditis.    3.  Palpitations.  I think the most likely culprit here is stress.  I'm going to place a 48 hour Holter monitor to evaluate these, however I think it's most likely either PACs or PVCs.    She will keep her appointment with Dr. Freeman in December.  Further recommendations will be made pending the results of her Holter monitor.    As always, it has been a pleasure to participate in your patient's care.      Sincerely,         Patti Mercedes PA-C

## 2018-10-09 ENCOUNTER — TELEPHONE (OUTPATIENT)
Dept: CARDIOLOGY | Facility: CLINIC | Age: 33
End: 2018-10-09

## 2019-03-19 ENCOUNTER — TELEPHONE (OUTPATIENT)
Dept: CARDIOLOGY | Facility: CLINIC | Age: 34
End: 2019-03-19

## 2019-03-19 NOTE — TELEPHONE ENCOUNTER
I called and told her to stop it she is lost a bunch of weight her blood pressure is lower we will see how she does off of it

## 2019-03-19 NOTE — TELEPHONE ENCOUNTER
Patient on losartan 25mg once daily  Wanting to know what else she can take. Was told to call us    [phone]

## 2025-01-30 NOTE — PROGRESS NOTES
Date of Office Visit: 2018  Encounter Provider: SCOOTER Jimenez  Place of Service: [facility] CARDIOLOGY  Patient Name: Kady Valiente  :1985    Chief Complaint   Patient presents with   • Cardiomyopathy     6 month follow up   :     HPI: Kady Valiente is a 32 y.o. female who presents today for follow-up.  Old records have been obtained and reviewed by me.  She is a patient with really no prior medical or cardiac history.  On 10/18/2017 she presented with sudden onset of acute chest pain.  EMS was called, and an ECG in the field showed inferior and lateral ST elevation consistent with a STEMI.  She was taken emergently for cardiac catheterization, which showed essentially normal coronary arteries.  She was then taken directly for an MRI of the heart, which showed a small area of inferoapical myocarditis.  It was felt that she had a focal myocarditis.  Overall she had normal LV function with an EF of 57.3% and no significant valvular abnormalities.  She was started on colchicine for anti-inflammatory purposes, as well as olmesartan and a beta-blockade.   The past 6 months she's been doing great.  She started exercising again.  She uses the elliptical machine for 30 minutes almost every night.  She denies any chest pain, shortness of breath, palpitations, edema, dizziness, or syncope.  Her blood pressure at home is typically running in the 130s systolic.  She states that she has lost about 10 pounds since she has been back to exercising, and she avoids sodium and tries to eat a healthy diet.    Past Medical History:   Diagnosis Date   • Hypertension    • Myocarditis        Past Surgical History:   Procedure Laterality Date   • CARDIAC CATHETERIZATION N/A 10/18/2017    Procedure: Left Heart Cath;  Surgeon: Rishi Freeman MD;  Location: [facility] CATH INVASIVE LOCATION;  Service:    • CARDIAC CATHETERIZATION N/A 10/18/2017    Procedure: Coronary angiography;  Surgeon:  01/30/25 10:59 AM     Chart reviewed for Mammogram was/were submitted to the patient's insurance.     Raquel Weeks   [facility]   Rishi Freeman MD;  Location: [facility] INVASIVE LOCATION;  Service:    • CARDIAC CATHETERIZATION N/A 10/18/2017    Procedure: Left ventriculography;  Surgeon: Rishi Freeman MD;  Location: [facility] INVASIVE LOCATION;  Service:        Social History     Social History   • Marital status:      Spouse name: N/A   • Number of children: N/A   • Years of education: N/A     Occupational History   • Not on file.     Social History Main Topics   • Smoking status: Never Smoker   • Smokeless tobacco: Never Used   • Alcohol use 0.6 oz/week     1 Glasses of wine per week      Comment: occ   • Drug use: No   • Sexual activity: Defer     Other Topics Concern   • Not on file     Social History Narrative   • No narrative on file       Family History   Problem Relation Age of Onset   • Emphysema Mother    • COPD Mother    • No Known Problems Father    • No Known Problems Maternal Grandmother    • No Known Problems Maternal Grandfather    • No Known Problems Paternal Grandmother    • No Known Problems Paternal Grandfather        Review of Systems   Constitution: Negative for chills, fever and malaise/fatigue.   Cardiovascular: Negative for chest pain, dyspnea on exertion, leg swelling, near-syncope, orthopnea, palpitations, paroxysmal nocturnal dyspnea and syncope.   Respiratory: Negative for cough and shortness of breath.    Musculoskeletal: Negative for joint pain, joint swelling and myalgias.   Gastrointestinal: Negative for abdominal pain, diarrhea, melena, nausea and vomiting.   Genitourinary: Negative for frequency and hematuria.   Neurological: Negative for light-headedness, numbness, paresthesias and seizures.   Allergic/Immunologic: Negative.    All other systems reviewed and are negative.      Allergies   Allergen Reactions   • Penicillins    • Prednisone          Current Outpatient Prescriptions:   •  colchicine (COLCRYS) 0.6 MG tablet, Take 1 tablet by mouth Daily., Disp: 30 tablet, Rfl: 6  •  IRON CR PO,  "Take 1 tablet by mouth Daily., Disp: , Rfl:   •  losartan (COZAAR) 25 MG tablet, TAKE ONE TABLET BY MOUTH DAILY, Disp: 90 tablet, Rfl: 2      Objective:     Vitals:    05/31/18 1141 05/31/18 1148   BP: 128/82 140/86   BP Location: Right arm Left arm   Pulse: 73    SpO2: 100%    Weight: 103 kg (226 lb)    Height: 175.3 cm (69\")      Body mass index is 33.37 kg/m².    PHYSICAL EXAM:    Physical Exam   Constitutional: She is oriented to person, place, and time. She appears well-developed and well-nourished. No distress.   HENT:   Head: Normocephalic and atraumatic.   Eyes: Pupils are equal, round, and reactive to light.   Neck: No JVD present. No thyromegaly present.   Cardiovascular: Normal rate, regular rhythm, normal heart sounds and intact distal pulses.    No murmur heard.  Pulmonary/Chest: Effort normal and breath sounds normal. No respiratory distress.   Abdominal: Soft. Bowel sounds are normal. She exhibits no distension. There is no splenomegaly or hepatomegaly. There is no tenderness.   Musculoskeletal: Normal range of motion. She exhibits no edema.   Neurological: She is alert and oriented to person, place, and time.   Skin: Skin is warm and dry. She is not diaphoretic. No erythema.   Psychiatric: She has a normal mood and affect. Her behavior is normal. Judgment normal.         ECG 12 Lead  Date/Time: 5/31/2018 11:51 AM  Performed by: BERTIN CHANG.  Authorized by: BERTIN CHANG.   Comparison: compared with previous ECG from 11/28/2017  Similar to previous ECG  Rhythm: sinus rhythm  BPM: 73  T depression: III and aVF  Q waves: II, III and aVF  Clinical impression: abnormal ECG  Comments: Indication: Hypertension, myocarditis.              Assessment:       Diagnosis Plan   1. Essential hypertension  ECG 12 Lead   2. Acute idiopathic myocarditis  ECG 12 Lead     Orders Placed This Encounter   Procedures   • ECG 12 Lead     This order was created via procedure documentation          Plan:       1.  " Hypertension.  Her blood pressure is well-controlled today on losartan 25 mg daily.  I think that if she continues to exercise and lose weight, we may be able to stop her losartan.  I instructed her that if her systolic blood pressure consistently stays below 120, she can discontinue the losartan.  If she goes back up into the 130s, she will restart it.    2.  Myocarditis.  She finally feels good enough to exercise again and denies any symptoms of heart failure at this time.  At this point, I'm going to discontinue the colchicine.  She will call me if she develops any symptoms.    She will follow-up with Dr. Freeman in 6 months or sooner if needed.    As always, it has been a pleasure to participate in your patient's care.      Sincerely,         Patti Mendez PA-C

## 2025-03-06 ENCOUNTER — TELEPHONE (OUTPATIENT)
Dept: CARDIOLOGY | Age: 40
End: 2025-03-06

## 2025-03-06 NOTE — TELEPHONE ENCOUNTER
Caller: REBECA    Relationship: [employer]    Best call back number: [phone]    What is the best time to reach you: ANY        What was the call regarding: STATED SHE CALLED [facility] WHO HAD NO RECORDS OF PATIENT SEEING DR. BABB:    LAST TWO VISIT NOTES-  FROM 2017 AND 2018      [phone]

## (undated) DEVICE — CATH VENT MIV RADL PIG ST TIP 5F 110CM

## (undated) DEVICE — PK CATH CARD 40

## (undated) DEVICE — CATH DIAG IMPULSE FR4 5F 100CM

## (undated) DEVICE — DEV INDEFLATOR

## (undated) DEVICE — TR BAND RADIAL ARTERY COMPRESSION DEVICE: Brand: TR BAND

## (undated) DEVICE — KT MANIFLD CARDIAC

## (undated) DEVICE — GW EMR FIX EXCHG J STD .035 3MM 260CM

## (undated) DEVICE — GLIDESHEATH BASIC HYDROPHILIC COATED INTRODUCER SHEATH: Brand: GLIDESHEATH

## (undated) DEVICE — CATH DIAG IMPULSE FL3.5 5F 100CM